# Patient Record
Sex: MALE | Race: WHITE | NOT HISPANIC OR LATINO | Employment: UNEMPLOYED | URBAN - METROPOLITAN AREA
[De-identification: names, ages, dates, MRNs, and addresses within clinical notes are randomized per-mention and may not be internally consistent; named-entity substitution may affect disease eponyms.]

---

## 2024-05-20 ENCOUNTER — APPOINTMENT (OUTPATIENT)
Dept: RADIOLOGY | Facility: CLINIC | Age: 19
End: 2024-05-20
Payer: COMMERCIAL

## 2024-05-20 VITALS
HEART RATE: 81 BPM | BODY MASS INDEX: 26.9 KG/M2 | SYSTOLIC BLOOD PRESSURE: 108 MMHG | DIASTOLIC BLOOD PRESSURE: 73 MMHG | WEIGHT: 203 LBS | HEIGHT: 73 IN

## 2024-05-20 DIAGNOSIS — M25.562 ACUTE PAIN OF LEFT KNEE: ICD-10-CM

## 2024-05-20 DIAGNOSIS — M22.41 CHONDROMALACIA OF RIGHT PATELLOFEMORAL JOINT: ICD-10-CM

## 2024-05-20 DIAGNOSIS — M25.561 RIGHT KNEE PAIN, UNSPECIFIED CHRONICITY: ICD-10-CM

## 2024-05-20 DIAGNOSIS — M25.561 RIGHT KNEE PAIN, UNSPECIFIED CHRONICITY: Primary | ICD-10-CM

## 2024-05-20 DIAGNOSIS — M25.562 LEFT KNEE PAIN, UNSPECIFIED CHRONICITY: ICD-10-CM

## 2024-05-20 DIAGNOSIS — M62.81 WEAKNESS OF RIGHT QUADRICEPS MUSCLE: ICD-10-CM

## 2024-05-20 PROCEDURE — 99204 OFFICE O/P NEW MOD 45 MIN: CPT | Performed by: ORTHOPAEDIC SURGERY

## 2024-05-20 PROCEDURE — 73564 X-RAY EXAM KNEE 4 OR MORE: CPT

## 2024-05-20 NOTE — PROGRESS NOTES
"Patient Name:  Rivas Gtz  MRN:  [id number]    Assessment & Plan     1. Right knee pain, unspecified chronicity  -     XR knee 4+ vw right injury; Future; Expected date: 05/20/2024  2. Acute pain of left knee  -     XR knee 4+ vw left injury; Future; Expected date: 05/20/2024  3. Chondromalacia of right patellofemoral joint  -     Ambulatory Referral to Physical Therapy; Future  4. Weakness of right quadriceps muscle  -     Ambulatory Referral to Physical Therapy; Future    Right knee quad weakness, bilateral knee patellar chondromalacia   X-rays and previous intraoperative arthroscopic images reviewed in office today with patient  Recommended nonoperative management of knee pain including outpatient PT to work on strengthening of quads, hamstring and hips, maintaining range of motion, short arc quad exercises and k-taping.   Continue OTC medication as needed for pain relief  Advised patient and mother to obtain op report from previous Right knee arthroscopy for evaluation  Follow up in 8 weeks for reevaluation. If patient's symptoms persist or worsen, can consider further imaging of the Right knee.     Chief Complaint     Bilateral knee pain    History of the Present Illness     Rivas Gtz is a 19 y.o. male with Bilateral knee pain, Right > Left. Patient admits Right knee pain ongoing for months with possible injury during  training. He admits to having knee arthroscopy with KAILASH biopsy 04/2023, performing PRP injections and \"1 month\" of outpatient PT. Patient had surgery and was told \"the meniscus healed, but the cartilage was wearing away unevenly\". Patient is in the  and in the process of getting medically discharged. In regards to the Left knee, patient admits about 1 month of pain without known injury. He states he has been compensating for the Right knee. He does have pain in the knees with stair ambulation. 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     Review of Systems " "    Review of Systems   Constitutional:  Negative for chills and fever.   HENT:  Negative for ear pain and sore throat.    Eyes:  Negative for pain and visual disturbance.   Respiratory:  Negative for cough and shortness of breath.    Cardiovascular:  Negative for chest pain and palpitations.   Gastrointestinal:  Negative for abdominal pain and vomiting.   Genitourinary:  Negative for dysuria and hematuria.   Musculoskeletal:  Negative for arthralgias and back pain.   Skin:  Negative for color change and rash.   Neurological:  Negative for seizures and syncope.   All other systems reviewed and are negative.      Physical Exam     /73   Pulse 81   Ht 6' 1\" (1.854 m)   Wt 92.1 kg (203 lb)   BMI 26.78 kg/m²     Right Knee  Range of motion from 0 to 140 degrees without pain.    There is no crepitus with range of motion.   There is no effusion.    There is tenderness over the medial patellar facet, medial joint line .    There is 4+/5 quadriceps strength and preserved tone.    The patient is able to perform a straight leg raise.      positive  patellar grind test.  Anterior drawer tests is negative  negative Lachman Test.   Posterior drawer test is   negative   Varus stress testing reveals no pain or instability at 0 and 30 degrees   Valgus stress testing reveals no pain or instability at 0 and 30 degrees  Isha's testing is negative   The patient is neurovascular intact distally.      Left Knee  Range of motion from 0 to 140 degrees without pain.    There is no crepitus with range of motion.   There is no effusion.    There is tenderness over the anteromedial joint line, medial femoral condyle.    There is 5/5 quadriceps strength and preserved tone.    The patient is able to perform a straight leg raise.      positive  patellar grind test.  Anterior drawer tests is negative  negative Lachman Test.   Posterior drawer test is   negative   Varus stress testing reveals no pain or instability at 0 and 30 degrees "   Valgus stress testing reveals no pain or instability at 0 and 30 degrees  Isha's testing is negative   The patient is neurovascular intact distally.      Able to perform body weight squat without pain  Dynamic valgus with Right sided single leg squat     Eyes:  Anicteric sclerae.  Neck:  Supple.  Lungs:  Normal respiratory effort.  Cardiovascular:  Capillary refill is less than 2 seconds.  Skin:  Intact without erythema.  Neurologic:  Sensation grossly intact to light touch.  Psychiatric:  Mood and affect are appropriate.    Data Review     I have personally reviewed pertinent films in PACS, and my interpretation follows:    X-rays taken 05/20/2024 of Right knee independently reviewed and demonstrate minimal to mild medial compartment joint space narrowing. Well maintained joint spaces. No acute fracture or dislocation.      X-rays taken 05/20/2024 of Left knee independently reviewed and demonstrate minimal to mild medial compartment joint space narrowing with well maintained joint spaces. No acute fracture or dislocation.     History reviewed. No pertinent past medical history.    Past Surgical History:   Procedure Laterality Date    KNEE SURGERY Right 04/19/2023       No Known Allergies    No current outpatient medications on file prior to visit.     No current facility-administered medications on file prior to visit.       Social History     Tobacco Use    Smoking status: Never    Smokeless tobacco: Never   Vaping Use    Vaping status: Never Used   Substance Use Topics    Alcohol use: Yes     Comment: soc    Drug use: Never       History reviewed. No pertinent family history.          Procedures Performed     Procedures  None       Steve Kwon DO

## 2024-06-13 ENCOUNTER — EVALUATION (OUTPATIENT)
Dept: PHYSICAL THERAPY | Facility: CLINIC | Age: 19
End: 2024-06-13
Payer: COMMERCIAL

## 2024-06-13 DIAGNOSIS — M22.41 CHONDROMALACIA OF RIGHT PATELLOFEMORAL JOINT: Primary | ICD-10-CM

## 2024-06-13 DIAGNOSIS — M62.81 WEAKNESS OF RIGHT QUADRICEPS MUSCLE: ICD-10-CM

## 2024-06-13 PROCEDURE — 97110 THERAPEUTIC EXERCISES: CPT

## 2024-06-13 PROCEDURE — 97162 PT EVAL MOD COMPLEX 30 MIN: CPT

## 2024-06-13 NOTE — PROGRESS NOTES
PT Evaluation     Today's date: 2024  Patient name: Rivas Gtz  : 2005  MRN: [id number]  Referring provider: Elizabeth Siddiqi PA-C  Dx:   Encounter Diagnosis     ICD-10-CM    1. Chondromalacia of right patellofemoral joint  M22.41 Ambulatory Referral to Physical Therapy      2. Weakness of right quadriceps muscle  M62.81 Ambulatory Referral to Physical Therapy          Start Time: 803  Stop Time: 843  Total time in clinic (min): 40 minutes    Assessment  Impairments: activity intolerance, impaired physical strength, lacks appropriate home exercise program, pain with function, participation limitations, activity limitations and endurance  Symptom irritability: high    Assessment details: Patient is a pleasant 19 y.o. male who presents to physical therapy with main reports of R knee pain.    No further referral appears necessary at this time based upon examination results.    Primary movement impairment diagnosis of quadriceps weakness resulting in pathoanatomical symptoms of decreased strength and increased symptom irritability. This limits Rivas's ability to return to previous activities such as walking long distances, hiking, and running.     Prognosis is good given HEP compliance and PT 2 times per week over the next 16 weeks.  Positive prognostic indicators include positive attitude toward recovery.  Negative prognostic indicators include two previous experiences w/ PT that resulted in no positive outcomes and the Pt's high symptom irritability level. .    Please contact me if you have any questions.  Thank you for the opportunity to share in Rivas Gtz care.    Understanding of Dx/Px/POC: good     Prognosis: fair    Goals  Short term:  Pt will be independent w/ individualized HEP  Pt will have a decrease in symptom irritability by 2 points     Long term:  Pt will be able to walk two miles w/ minimal symptom irritability  Pt will be able to hike one mile on rough terrain w/  minimal symptom irritability  Pt will be able to run short distances w/ minimal symptom irritability  Pt will improve FOTO by MDC      Plan  Patient would benefit from: skilled physical therapy  Referral necessary: No    Planned therapy interventions: abdominal trunk stabilization, activity modification, joint mobilization, kinesiology taping, manual therapy, massage, nerve gliding, neuromuscular re-education, strengthening, stretching, therapeutic activities, therapeutic exercise, therapeutic training, transfer training, home exercise program, graded exercise, graded activity, body mechanics training and balance    Frequency: 2x week  Plan of Care beginning date: 2024  Plan of Care expiration date: 10/3/2024  Treatment plan discussed with: patient        Subjective Evaluation    History of Present Illness  Mechanism of injury: Pt reports to outpatient PT for R knee pain that started over two years ago. 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 Pt reports that his knee pain was developed gradually over a prolonged period of time. Once seeking medical attention, Pt tried PT before surgery which had no positive results. Pt then had surgery for possible meniscus tear, found decreased cartilage in R knee. Pt had PT post surgery and had PRP injections which increased the Pt's knee pain. Pt reports the inability to squat to the ground, has pain with full extension, can not run, and can not hike due to the knee pain. Pt has been avoiding LE weight training due to the knee pain. Pt reported that PT interventions that did not help included Cupping and IASTM. No red flags are present.          Recurrent probem    Patient Goals  Patient goals for therapy: decreased pain, increased strength, independence with ADLs/IADLs, return to sport/leisure activities and increased motion  Patient goal: Biking, Hiking, and running  Pain  Current pain ratin  At best pain  ratin  At worst pain ratin  Location: Medial joint line  Quality: knife-like, sharp, tight and burning  Alleviating factors: nothing.  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     Neurological Testing     Sensation     Lumbar   Left   Intact: light touch    Right   Hypersensation: light touch    Comments   Right light touch: over the L3 dermatome    Reflexes   Left   Patellar (L4): normal (2+)  Achilles (S1): normal (2+)    Right   Patellar (L4): normal (2+)  Achilles (S1): normal (2+)    Active Range of Motion     Lumbar   Flexion:  WFL  Extension: Active lumbar extension: end range.  WFL and with pain  Left lateral flexion:  WFL and with pain  Right lateral flexion:  WFL  Left rotation:  WFL and with pain  Right rotation:  WFL  Left Knee   Normal active range of motion    Right Knee   Normal active range of motion    Passive Range of Motion   Left Knee   Normal passive range of motion    Right Knee   Normal passive range of motion    Mobility   Patellar Mobility:   Left Knee   WFL: medial, lateral, superior and inferior.     Right Knee   WFL: medial, lateral, superior and inferior    Patellar Mobility Comments   Right medial tendon comments: pain.     Patellar Static Positioning   Left Knee: WFL  Right Knee: WFL    Strength/Myotome Testing     Left Hip   Planes of Motion   Flexion: 4+  Extension: 4+  Abduction: 4+  Adduction: 4+    Right Hip   Planes of Motion   Flexion:  4+  Extension: 4+  Abduction: 4+  Adduction: 4+    Left Knee   Flexion: 4+  Prone flexion: 4+  Extension: 4+  Quadriceps contraction: good    Right Knee   Flexion: 4+  Prone flexion: 4+  Extension: 4  Quadriceps contraction: good    Left Ankle/Foot   Dorsiflexion: WFL.   Plantar flexion: WFL.   Inversion: WFL.   Eversion: WFL.   Great toe flexion: WFL.   Great toe extension: WFL.     Right Ankle/Foot   Dorsiflexion: WFL.   Plantar flexion: WFL.   Inversion: WFL.   Eversion: WFL.   Great toe flexion: WFL.   Great toe extension: WFL.     Additional Strength Details  Visible atrophy of the R quad compared to the L             Precautions: Heavy symptom irritbailty    POC expires Unit limit Auth Expiration date PT/OT/ST + Visit Limit?   10/3/24 BOMN N/A BOMN                           Visit/Unit Tracking  AUTH Status:  Date 6/13              N/A Used 1               Remaining                    HEP: [id number]   Manuals 6/13                                                                Neuro Re-Ed             BFR SLR             BFR side lying SLR             BFR SL hip bridge                                                                  Ther Ex             HEP education 10'            Slant board BLE 45 squat             SL barbell hip flex isometric             Lateral step up 45 degree isometric             Split squat isometric w/ plantar flexion             SL RDL                                       Ther Activity                                       Gait Training                                       Modalities

## 2024-06-17 ENCOUNTER — OFFICE VISIT (OUTPATIENT)
Dept: PHYSICAL THERAPY | Facility: CLINIC | Age: 19
End: 2024-06-17
Payer: COMMERCIAL

## 2024-06-17 DIAGNOSIS — M62.81 WEAKNESS OF RIGHT QUADRICEPS MUSCLE: ICD-10-CM

## 2024-06-17 DIAGNOSIS — M22.41 CHONDROMALACIA OF RIGHT PATELLOFEMORAL JOINT: Primary | ICD-10-CM

## 2024-06-17 PROCEDURE — 97110 THERAPEUTIC EXERCISES: CPT

## 2024-06-17 PROCEDURE — 97112 NEUROMUSCULAR REEDUCATION: CPT

## 2024-06-17 NOTE — PROGRESS NOTES
"Daily Note     Today's date: 2024  Patient name: Rivas Gtz  : 2005  MRN: [id number]  Referring provider: Elizabeth Siddiqi PA-C  Dx:   Encounter Diagnosis     ICD-10-CM    1. Chondromalacia of right patellofemoral joint  M22.41       2. Weakness of right quadriceps muscle  M62.81           Start Time: 0800  Stop Time: 0846  Total time in clinic (min): 46 minutes    Subjective: Pt reports no major changes from initial visit. Pt reported no issues w/ HEP.       Objective: See treatment diary below      Assessment: Tolerated treatment well. Initiated strengthening program. Pt reported dislike in utilizing the BFR. DC BFR NV and increase functional SL activities. Pt was challenged by isometric SL exercises. Pt utilized UE assist for SL balance. Patient demonstrated fatigue post treatment, exhibited good technique with therapeutic exercises, and would benefit from continued PT for increased strength and decreased symptom irritability.       Plan: Continue per plan of care.  Progress treatment as tolerated.       Precautions: Heavy symptom irritbailty    POC expires Unit limit Auth Expiration date PT/OT/ST + Visit Limit?   10/3/24 BOMN N/A BOMN                           Visit/Unit Tracking  AUTH Status:  Date              N/A Used 1 2              Remaining                    HEP: [id number]   Manuals                                                                Neuro Re-Ed             BFR SLR  BFR 30,15,15, 15           BFR side lying SLR  BFR 30,15,15, 15           BFR SL hip bridge   BFR 30,15,15, 15                                                               Ther Ex             HEP education 10'            Slant board BLE 45 squat             SL barbell hip flex isometric  30\" x4           Lateral step up 45 degree isometric  15\" x4           Split squat isometric w/ plantar flexion  15\" x3           SL RDL                          Bike  5'           Ther Activity               "                         Gait Training                                       Modalities

## 2024-06-19 ENCOUNTER — OFFICE VISIT (OUTPATIENT)
Dept: PHYSICAL THERAPY | Facility: CLINIC | Age: 19
End: 2024-06-19
Payer: COMMERCIAL

## 2024-06-19 DIAGNOSIS — M62.81 WEAKNESS OF RIGHT QUADRICEPS MUSCLE: ICD-10-CM

## 2024-06-19 DIAGNOSIS — M22.41 CHONDROMALACIA OF RIGHT PATELLOFEMORAL JOINT: Primary | ICD-10-CM

## 2024-06-19 PROCEDURE — 97112 NEUROMUSCULAR REEDUCATION: CPT

## 2024-06-19 PROCEDURE — 97110 THERAPEUTIC EXERCISES: CPT

## 2024-06-19 NOTE — PROGRESS NOTES
"Daily Note     Today's date: 2024  Patient name: Rivas Gtz  : 2005  MRN: [id number]  Referring provider: Elizabeth Siddiqi PA-C  Dx:   Encounter Diagnosis     ICD-10-CM    1. Chondromalacia of right patellofemoral joint  M22.41       2. Weakness of right quadriceps muscle  M62.81           Start Time: 1225  Stop Time: 1305  Total time in clinic (min): 40 minutes    Subjective: Pt reports he was sore and had onset of burning sensation in the knee following last session.       Objective: See treatment diary below      Assessment: Tolerated treatment well. Pt unable to squat deeper than 45 degrees as the pain increases above a 5/10 . Pt able to hold an Isometric hold at 45 degrees w/ tension in quad, but no pain in the knee. Pt did better w/ SL exercises, required cueing for most of new exercises. Pt has less symptom irritability moving through smaller ranges of motion, continue to challenge Pt w/ isometric exercises. Patient demonstrated fatigue post treatment, exhibited good technique with therapeutic exercises, and would benefit from continued PT for increased strength and decreased symptom irritability.       Plan: Continue per plan of care.  Progress treatment as tolerated.       Precautions: Heavy symptom irritbailty    POC expires Unit limit Auth Expiration date PT/OT/ST + Visit Limit?   10/3/24 BOMN N/A BOMN                           Visit/Unit Tracking  AUTH Status:  Date             N/A Used 1 2 3             Remaining                    HEP: [id number]   Manuals                                                               Neuro Re-Ed             BFR SLR  BFR 30,15,15, 15           BFR side lying SLR  BFR 30,15,15, 15           BFR SL hip bridge   BFR 30,15,15, 15           SL BOSU balance   30\" x4          SL BOSU eccentric taps   2x10                                    Ther Ex             HEP education 10'            Slant board BLE 45 iso   10\" x10        " "  SL barbell hip flex isometric  30\" x4           Lateral step up 45 degree isometric  15\" x4           Step up   2x10 24\"          Split squat isometric w/ plantar flexion  15\" x3 15\" x5          SL RDL   2x10          Multi-directional walking [place]    X10 25#          Bike  5' 5'          Ther Activity                                       Gait Training                                       Modalities                                            "

## 2024-06-25 ENCOUNTER — OFFICE VISIT (OUTPATIENT)
Dept: PHYSICAL THERAPY | Facility: CLINIC | Age: 19
End: 2024-06-25
Payer: COMMERCIAL

## 2024-06-25 DIAGNOSIS — M62.81 WEAKNESS OF RIGHT QUADRICEPS MUSCLE: ICD-10-CM

## 2024-06-25 DIAGNOSIS — M22.41 CHONDROMALACIA OF RIGHT PATELLOFEMORAL JOINT: Primary | ICD-10-CM

## 2024-06-25 PROCEDURE — 97110 THERAPEUTIC EXERCISES: CPT

## 2024-06-25 PROCEDURE — 97112 NEUROMUSCULAR REEDUCATION: CPT

## 2024-06-25 NOTE — PROGRESS NOTES
"Daily Note     Today's date: 2024  Patient name: Rivas Gtz  : 2005  MRN: [id number]  Referring provider: Elizabeth Siddiqi PA-C  Dx:   Encounter Diagnosis     ICD-10-CM    1. Chondromalacia of right patellofemoral joint  M22.41       2. Weakness of right quadriceps muscle  M62.81           Start Time: 0800  Stop Time: 0840  Total time in clinic (min): 40 minutes    Subjective: Pt reports his knee is sore from a long weekend of heavy activity as the Pt was playing air soft. Pt reported that movement felt better than prolonged standing.        Objective: See treatment diary below      Assessment: Tolerated treatment well. Pt reported an increase in symptom irritability in the quad following eccentric step downs on BOSU. Foam rolling the quad decreased soreness levels minimally. Pt requires heavy cueing for exercises. Most of the Pt's symptom irritability is in the lateral quad. Patient demonstrated fatigue post treatment and would benefit from continued PT for increased strength and decreased symptom irritability.       Plan: Continue per plan of care.  Progress treatment as tolerated.       Precautions: Heavy symptom irritbailty    POC expires Unit limit Auth Expiration date PT/OT/ST + Visit Limit?   10/3/24 BOMN N/A BOMN                           Visit/Unit Tracking  AUTH Status:  Date            N/A Used 1 2 3 4            Remaining                    HEP: [id number]   Manuals          FR quad    JMK                                                Neuro Re-Ed             BFR SLR  BFR 30,15,15, 15           BFR side lying SLR  BFR 30,15,15, 15           BFR SL hip bridge   BFR 30,15,15, 15           SL BOSU balance   30\" x4 20\" x4         SL BOSU eccentric taps   2x10 2x10         SL BOSU hip flexion    2x10 ea                      Ther Ex             HEP education 10'            Slant board BLE 45 iso   10\" x10          SL barbell hip flex isometric  30\" x4   " "        Lateral step up 45 degree isometric  15\" x4           Step up   2x10 24\"          Split squat isometric w/ plantar flexion  15\" x3 15\" x5          SL RDL   2x10          Multi-directional walking [place]    X10 25# X10 25#         Slant board split squat    2x10         Bike  5' 5' 5'         Ther Activity                                       Gait Training                                       Modalities                                            "

## 2024-06-27 ENCOUNTER — OFFICE VISIT (OUTPATIENT)
Dept: PHYSICAL THERAPY | Facility: CLINIC | Age: 19
End: 2024-06-27
Payer: COMMERCIAL

## 2024-06-27 DIAGNOSIS — M22.41 CHONDROMALACIA OF RIGHT PATELLOFEMORAL JOINT: Primary | ICD-10-CM

## 2024-06-27 DIAGNOSIS — M62.81 WEAKNESS OF RIGHT QUADRICEPS MUSCLE: ICD-10-CM

## 2024-06-27 PROCEDURE — 97112 NEUROMUSCULAR REEDUCATION: CPT

## 2024-06-27 PROCEDURE — 97110 THERAPEUTIC EXERCISES: CPT

## 2024-06-27 NOTE — PROGRESS NOTES
"Daily Note     Today's date: 2024  Patient name: Rivas Gtz  : 2005  MRN: [id number]  Referring provider: Elizabeth Siddiqi PA-C  Dx:   Encounter Diagnosis     ICD-10-CM    1. Chondromalacia of right patellofemoral joint  M22.41       2. Weakness of right quadriceps muscle  M62.81           Start Time: 0800  Stop Time: 0845  Total time in clinic (min): 45 minutes    Subjective: Pt reports his ws sore following last session for about 24 hours. Pt felt normal yesterday, w/ his normal symptom presentation.       Objective: See treatment diary below      Assessment: Tolerated treatment well. Pt FOTO increased 5 points compared to initial visit. Pt reported fatigue in the quad, w/ no report of increased knee pain. Pt did better w/ exercises requiring less frequent cueing. Pt demonstrated good SL balance w/ BOSU activities. Patient demonstrated fatigue post treatment, exhibited good technique with therapeutic exercises, and would benefit from continued PT for increased strength and decreased symptom irritability.       Plan: Continue per plan of care.  Progress treatment as tolerated.       Precautions: Heavy symptom irritbailty    POC expires Unit limit Auth Expiration date PT/OT/ST + Visit Limit?   10/3/24 BOMN N/A BOMN                           Visit/Unit Tracking  AUTH Status:  Date           N/A Used 1 2 3 4 5           Remaining                    HEP: [id number]   Manuals         FR quad    JMK                                                Neuro Re-Ed             BFR SLR  BFR 30,15,15, 15           BFR side lying SLR  BFR 30,15,15, 15           BFR SL hip bridge   BFR 30,15,15, 15           SL BOSU balance   30\" x4 20\" x4 30\" x4        SL BOSU eccentric taps   2x10 2x10 2x10        SL BOSU hip flexion    2x10 ea                      Ther Ex             HEP education 10'            Slant board BLE 45 iso   10\" x10          SL barbell hip flex " "isometric  30\" x4           Lateral step up 45 degree isometric  15\" x4           Step up   2x10 24\"          Split squat isometric w/ plantar flexion  15\" x3 15\" x5          SL RDL   2x10          Multi-directional walking [place]    X10 25# X10 25# X10  35#        Slant board split squat    2x10 3x5 25#        Sled pull/push     x10        Wall sit      X2 till failure 25#        Bike  5' 5' 5' 5'        Ther Activity                                       Gait Training                                       Modalities                                            "

## 2024-07-01 ENCOUNTER — OFFICE VISIT (OUTPATIENT)
Dept: PHYSICAL THERAPY | Facility: CLINIC | Age: 19
End: 2024-07-01
Payer: COMMERCIAL

## 2024-07-01 DIAGNOSIS — M22.41 CHONDROMALACIA OF RIGHT PATELLOFEMORAL JOINT: Primary | ICD-10-CM

## 2024-07-01 DIAGNOSIS — M62.81 WEAKNESS OF RIGHT QUADRICEPS MUSCLE: ICD-10-CM

## 2024-07-01 PROCEDURE — 97112 NEUROMUSCULAR REEDUCATION: CPT

## 2024-07-01 PROCEDURE — 97110 THERAPEUTIC EXERCISES: CPT

## 2024-07-01 NOTE — PROGRESS NOTES
"Daily Note     Today's date: 2024  Patient name: Rivas Gtz  : 2005  MRN: [id number]  Referring provider: Elizabeth Siddiqi PA-C  Dx:   Encounter Diagnosis     ICD-10-CM    1. Chondromalacia of right patellofemoral joint  M22.41       2. Weakness of right quadriceps muscle  M62.81         Start Time: 0730  Stop Time: 0810  Total time in clinic (min): 40 minutes    Subjective: Pt reports that he did a lot of walking over the weekend which caused onset of knee pain following about 30 minutes of consistent walking.       Objective: See treatment diary below      Assessment: Tolerated treatment well. Pt reported less symptom irritability w/ eccentric control during step downs. Pt still challenged by SL isometric holds. Continue to challenge both SL balance and strength. Patient demonstrated fatigue post treatment, exhibited good technique with therapeutic exercises, and would benefit from continued PT for increased strength and decreased symptom irritability.       Plan: Continue per plan of care.  Progress treatment as tolerated.       Precautions: Heavy symptom irritbailty    POC expires Unit limit Auth Expiration date PT/OT/ST + Visit Limit?   10/3/24 BOMN N/A BOMN                           Visit/Unit Tracking  AUTH Status:  Date          N/A Used 1 2 3 4 5 6          Remaining                    HEP: [email or web address]   Manuals        FR quad    JMK                                                Neuro Re-Ed             BFR SLR  BFR 30,15,15, 15           BFR side lying SLR  BFR 30,15,15, 15           BFR SL hip bridge   BFR 30,15,15, 15           SL BOSU balance   30\" x4 20\" x4 30\" x4 30\" x4       SL BOSU eccentric taps   2x10 2x10 2x10 2x10       SL BOSU hip flexion    2x10 ea                      Ther Ex             HEP education 10'            Slant board BLE 45 iso   10\" x10          SL barbell hip flex isometric  30\" x4           Lateral step up " "45 degree isometric  15\" x4    20\" x4       Step up   2x10 24\"   3x10 24\"       Split squat isometric w/ plantar flexion  15\" x3 15\" x5          SL RDL   2x10          Multi-directional walking [place]    X10 25# X10 25# X10  35# X10  35#       Slant board split squat    2x10 3x5 25#        Sled pull/push     x10 x10       Wall sit      X2 till failure 25# X2 till failure 25#       Bike  5' 5' 5' 5' 5'       Ther Activity                                       Gait Training                                       Modalities                                            "

## 2024-07-03 ENCOUNTER — OFFICE VISIT (OUTPATIENT)
Dept: PHYSICAL THERAPY | Facility: CLINIC | Age: 19
End: 2024-07-03
Payer: COMMERCIAL

## 2024-07-03 DIAGNOSIS — M62.81 WEAKNESS OF RIGHT QUADRICEPS MUSCLE: ICD-10-CM

## 2024-07-03 DIAGNOSIS — M22.41 CHONDROMALACIA OF RIGHT PATELLOFEMORAL JOINT: Primary | ICD-10-CM

## 2024-07-03 PROCEDURE — 97112 NEUROMUSCULAR REEDUCATION: CPT | Performed by: PHYSICAL MEDICINE & REHABILITATION

## 2024-07-03 PROCEDURE — 97110 THERAPEUTIC EXERCISES: CPT | Performed by: PHYSICAL MEDICINE & REHABILITATION

## 2024-07-03 NOTE — PROGRESS NOTES
"Daily Note     Today's date: 7/3/2024  Patient name: Rivas Gtz  : 2005  MRN: [id number]  Referring provider: Elizabeth Siddiqi PA-C  Dx:   Encounter Diagnosis     ICD-10-CM    1. Chondromalacia of right patellofemoral joint  M22.41       2. Weakness of right quadriceps muscle  M62.81                    Subjective: Patient reports pain with all activity, stairclimbing reported as producing most significant pain. No significant improvement in symptoms.     Objective: See treatment diary below    Assessment: Tolerated treatment well overall. Decreased balance control noted with RDLs. Patient demonstrated fatigue post treatment, exhibited good technique with therapeutic exercises, and would benefit from continued PT. Continue as able.       Plan: Continue per plan of care.  Progress treatment as tolerated.       Precautions: Heavy symptom irritbailty    POC expires Unit limit Auth Expiration date PT/OT/ST + Visit Limit?   10/3/24 BOMN N/A BOMN                           Visit/Unit Tracking  AUTH Status:  Date 6/13 6/17 6/19 6/25 6/27 7/1 7/3        N/A Used 1 2 3 4 5 6 7         Remaining                    HEP: [email or web address]   Manuals 6/13 6/17 6/19 6/25 6/27 7/1 7/3      FR quad    JMK                                                Neuro Re-Ed       7/3      BFR SLR  BFR 30,15,15, 15     SL RDL foam 4x5x5\"      BFR side lying SLR  BFR 30,15,15, 15           BFR SL hip bridge   BFR 30,15,15, 15           SL BOSU balance   30\" x4 20\" x4 30\" x4 30\" x4 4x30\"      SL BOSU eccentric taps   2x10 2x10 2x10 2x10 3x10       SL BOSU hip flexion    2x10 ea                      Ther Ex       7/3      HEP education 10'            Slant board BLE 45 iso   10\" x10    Squat heels on foam beam 2x10      SL barbell hip flex isometric  30\" x4           Lateral step up 45 degree isometric  15\" x4    20\" x4       Step up   2x10 24\"   3x10 24\" 20\" 3x10      Split squat isometric w/ plantar flexion  15\" x3 15\" x5          SL RDL   " 2x10          Multi-directional walking [place]    X10 25# X10 25# X10  35# X10  35#       Slant board split squat    2x10 3x5 25#        Sled pull/push     x10 x10 10 laps      Wall sit      X2 till failure 25# X2 till failure 25# 2xF 25#      Bike  5' 5' 5' 5' 5' 5'      Ther Activity                                       Gait Training                                       Modalities

## 2024-07-08 ENCOUNTER — OFFICE VISIT (OUTPATIENT)
Dept: PHYSICAL THERAPY | Facility: CLINIC | Age: 19
End: 2024-07-08
Payer: COMMERCIAL

## 2024-07-08 DIAGNOSIS — M22.41 CHONDROMALACIA OF RIGHT PATELLOFEMORAL JOINT: Primary | ICD-10-CM

## 2024-07-08 DIAGNOSIS — M62.81 WEAKNESS OF RIGHT QUADRICEPS MUSCLE: ICD-10-CM

## 2024-07-08 PROCEDURE — 97110 THERAPEUTIC EXERCISES: CPT

## 2024-07-08 PROCEDURE — 97112 NEUROMUSCULAR REEDUCATION: CPT

## 2024-07-08 NOTE — PROGRESS NOTES
"Daily Note     Today's date: 2024  Patient name: Rivas Gtz  : 2005  MRN: [id number]  Referring provider: Elizabeth Siddiqi PA-C  Dx:   Encounter Diagnosis     ICD-10-CM    1. Chondromalacia of right patellofemoral joint  M22.41       2. Weakness of right quadriceps muscle  M62.81           Start Time: 0800  Stop Time: 0840  Total time in clinic (min): 40 minutes    Subjective: Patient reports he was able to walk more than 8 miles before having to take a break due to increased sharp pain in R knee.       Objective: See treatment diary below      Assessment: Tolerated treatment well. Pt reported muscle twitching in R quad following landmine squats. Pt still challenged by SL balance. Pt reported minor burning and pain in R medial knee post session. Patient demonstrated fatigue post treatment, exhibited good technique with therapeutic exercises, and would benefit from continued PT for increased strength, increased balance, and decreased symptom irritability.       Plan: Continue per plan of care.  Progress treatment as tolerated.       Precautions: Heavy symptom irritbailty    POC expires Unit limit Auth Expiration date PT/OT/ST + Visit Limit?   10/3/24 BOMN N/A BOMN                           Visit/Unit Tracking  AUTH Status:  Date 6/13 6/17 6/19 6/25 6/27 7/1 7/3 7/8       N/A Used 1 2 3 4 5 6 7 8        Remaining                    HEP: [email or web address]   Manuals 6/13 6/17 6/19 6/25 6/27 7/1 7/3 7     FR quad    JMK                                                Neuro Re-Ed       7/3      BFR SLR  BFR 30,15,15, 15     SL RDL foam 4x5x5\"      BFR side lying SLR  BFR 30,15,15, 15           BFR SL hip bridge   BFR 30,15,15, 15           SL BOSU balance   30\" x4 20\" x4 30\" x4 30\" x4 4x30\" 4x30\"     SL BOSU eccentric taps   2x10 2x10 2x10 2x10 3x10       SL BOSU hip flexion    2x10 ea         SL RDL BOSU        4x5 5\"     Ther Ex       7/3      HEP education 10'            Slant board BLE 45 iso   10\" x10    " "Squat heels on foam beam 2x10      SL barbell hip flex isometric  30\" x4           Lateral step up 45 degree isometric  15\" x4    20\" x4       Step up   2x10 24\"   3x10 24\" 20\" 3x10      Split squat isometric w/ plantar flexion  15\" x3 15\" x5          SL RDL   2x10          Multi-directional walking Marcie    X10 25# X10 25# X10  35# X10  35#       Slant board split squat    2x10 3x5 25#   2x10 25#     Sled pull/push     x10 x10 10 laps x10     Wall sit      X2 till failure 25# X2 till failure 25# 2xF 25#      Heel elevated squat         3x10 25#     Landmine split and hack squat        25# 2x10 ea     Bike  5' 5' 5' 5' 5' 5' 5'     Ther Activity                                       Gait Training                                       Modalities                                            "

## 2024-07-10 ENCOUNTER — OFFICE VISIT (OUTPATIENT)
Dept: PHYSICAL THERAPY | Facility: CLINIC | Age: 19
End: 2024-07-10
Payer: COMMERCIAL

## 2024-07-10 DIAGNOSIS — M62.81 WEAKNESS OF RIGHT QUADRICEPS MUSCLE: ICD-10-CM

## 2024-07-10 DIAGNOSIS — M22.41 CHONDROMALACIA OF RIGHT PATELLOFEMORAL JOINT: Primary | ICD-10-CM

## 2024-07-10 PROCEDURE — 97110 THERAPEUTIC EXERCISES: CPT

## 2024-07-10 NOTE — PROGRESS NOTES
"Daily Note     Today's date: 7/10/2024  Patient name: Rivas Gtz  : 2005  MRN: [id number]  Referring provider: Elizabeth Siddiqi PA-C  Dx:   Encounter Diagnosis     ICD-10-CM    1. Chondromalacia of right patellofemoral joint  M22.41       2. Weakness of right quadriceps muscle  M62.81           Start Time: 0800  Stop Time: 0844  Total time in clinic (min): 44 minutes    Subjective: Patient reports heavy soreness following last session in bilateral quadriceps.       Objective: See treatment diary below      Assessment: Tolerated treatment well. Pt reported increased soreness following today's session. Pt has follow-up w/ physician Monday to determine if he will continue therapy. Monitor Pt NV or possible discharge. Pt was unable to complete an RDL. Pt was provided heavy cueing for exercise but was unable to maintain proper technique, DC to prevent injury. Pt did better w/ isometric exercises and demonstrated more stability when in SL position. Pt has made an improvement in overall strength and stability since starting therapy, would recommenced more therapy to continue to strength the R knee. Patient demonstrated fatigue post treatment, exhibited good technique with therapeutic exercises, and would benefit from continued PT for increased strength, increased balance, and decreased symptom irritability.       Plan: Continue per plan of care.  Progress treatment as tolerated.       Precautions: Heavy symptom irritbailty    POC expires Unit limit Auth Expiration date PT/OT/ST + Visit Limit?   10/3/24 BOMN N/A BOMN                           Visit/Unit Tracking  AUTH Status:  Date 6/13 6/17 6/19 6/25 6/27 7/1 7/3 7/8 7/10      N/A Used 1 2 3 4 5 6 7 8 9       Remaining                    HEP: [email or web address]   Manuals  7/3 7/8 7/10    FR quad    JMK                                                Neuro Re-Ed       7/3      BFR SLR  BFR 30,15,15, 15     SL RDL foam 4x5x5\"      BFR side " "lying SLR  BFR 30,15,15, 15           BFR SL hip bridge   BFR 30,15,15, 15           SL BOSU balance   30\" x4 20\" x4 30\" x4 30\" x4 4x30\" 4x30\"     SL BOSU eccentric taps   2x10 2x10 2x10 2x10 3x10       SL BOSU hip flexion    2x10 ea         SL RDL BOSU        4x5 5\"     Ther Ex       7/3      HEP education 10'            Slant board BLE 45 iso   10\" x10    Squat heels on foam beam 2x10  10\"x10    SL barbell hip flex isometric  30\" x4       30\" x5    Lateral step up 45 degree isometric  15\" x4    20\" x4       Step up   2x10 24\"   3x10 24\" 20\" 3x10      Split squat isometric w/ plantar flexion  15\" x3 15\" x5          SL RDL   2x10          Multi-directional walking [place]    X10 25# X10 25# X10  35# X10  35#       Slant board split squat    2x10 3x5 25#   2x10 25#     Sled pull/push     x10 x10 10 laps x10 x8    Wall sit      X2 till failure 25# X2 till failure 25# 2xF 25#      Heel elevated squat         3x10 25#     Landmine split and hack squat        25# 2x10 ea     Reverse nordic curls w/ assist         2x10 20#    RDL         Unable    Lunge Matrix         X10 ea    Bike  5' 5' 5' 5' 5' 5' 5' 5'    Ther Activity                                       Gait Training                                       Modalities                                            "

## 2024-07-15 ENCOUNTER — OFFICE VISIT (OUTPATIENT)
Dept: OBGYN CLINIC | Facility: CLINIC | Age: 19
End: 2024-07-15
Payer: COMMERCIAL

## 2024-07-15 VITALS
DIASTOLIC BLOOD PRESSURE: 73 MMHG | BODY MASS INDEX: 26.77 KG/M2 | WEIGHT: 202 LBS | HEIGHT: 73 IN | HEART RATE: 61 BPM | SYSTOLIC BLOOD PRESSURE: 113 MMHG

## 2024-07-15 DIAGNOSIS — M22.41 CHONDROMALACIA OF RIGHT PATELLOFEMORAL JOINT: ICD-10-CM

## 2024-07-15 DIAGNOSIS — M23.91 INTERNAL DERANGEMENT OF RIGHT KNEE: Primary | ICD-10-CM

## 2024-07-15 PROCEDURE — 99214 OFFICE O/P EST MOD 30 MIN: CPT | Performed by: ORTHOPAEDIC SURGERY

## 2024-07-15 NOTE — PROGRESS NOTES
"Patient Name:  Rivas Gtz  MRN:  [id number]    Assessment & Plan     1. Internal derangement of right knee  -     MRI knee right  wo contrast; Future; Expected date: 07/15/2024  2. Chondromalacia of right patellofemoral joint  3. Weakness of right quadriceps muscle    Right knee quadriceps weakness, bilateral knee patellar chondromalacia.   Continue home exercises as prescribed  Continue outpatient physical therapy, may decrease to once per week  Due to persistent pain despite PT, activity modification, home exercise program, right knee MRI study was ordered to evaluate cartilage surfaces and alignment to assess for possible patellofemoral chondral defect.   OTC analgesics as needed for symptom management  Follow up after MRI study      History of the Present Illness   Rivas Gtz is a 19 y.o. male with Right knee quadriceps weakness, bilateral knee patellar chondromalacia. He was last seen in the office on 5/20/2024 when he was referred to outpatient physical therapy. He feels about the same as his last visit. He has not felt any better or worse with the exercises at PT. He is feeling clicking in his VMO and medial knee. He admits his activity level is the same. He is sitting most of the day at his job.        Review of Systems     Review of Systems   Constitutional:  Negative for chills and fever.   HENT:  Negative for ear pain and sore throat.    Eyes:  Negative for pain and visual disturbance.   Respiratory:  Negative for cough and shortness of breath.    Cardiovascular:  Negative for chest pain and palpitations.   Gastrointestinal:  Negative for abdominal pain and vomiting.   Genitourinary:  Negative for dysuria and hematuria.   Musculoskeletal:  Negative for arthralgias and back pain.   Skin:  Negative for color change and rash.   Neurological:  Negative for seizures and syncope.   All other systems reviewed and are negative.      Physical Exam     /73   Pulse 61   Ht 6' 1\" (1.854 m)   " Wt 91.6 kg (202 lb)   BMI 26.65 kg/m²     Right Knee  Range of motion from 0 to 130.    There is no effusion.    There is mild tenderness over the lateral patellar facet, MCL, VMO, distal adductor tendon  Positive patella grind  1+ quadrant lateral translation with firm endpoint.    The patient is able to perform a straight leg raise.    Varus stress testing reveals no instability at 0 and 30 degrees   Valgus stress testing reveals no instability at 0 and 30 degrees  The patient is neurovascular intact distally.      Data Review     I have personally reviewed pertinent films in PACS, and my interpretation follows.    X-rays taken 05/20/2024 of Right knee independently reviewed and demonstrate minimal to mild medial compartment joint space narrowing. Well maintained joint spaces. No acute fracture or dislocation.       X-rays taken 05/20/2024 of Left knee independently reviewed and demonstrate minimal to mild medial compartment joint space narrowing with well maintained joint spaces. No acute fracture or dislocation.   Social History     Tobacco Use    Smoking status: Never    Smokeless tobacco: Never   Vaping Use    Vaping status: Never Used   Substance Use Topics    Alcohol use: Yes     Comment: soc    Drug use: Never           Procedures  None.    Faby Quezada   Scribe Attestation      I,:  Faby Quezada am acting as a scribe while in the presence of the attending physician.:       I,:  Steve Kwon, DO personally performed the services described in this documentation    as scribed in my presence.:

## 2024-07-23 ENCOUNTER — HOSPITAL ENCOUNTER (OUTPATIENT)
Dept: MRI IMAGING | Facility: CLINIC | Age: 19
Discharge: HOME | End: 2024-07-23
Payer: COMMERCIAL

## 2024-07-23 DIAGNOSIS — M23.91 INTERNAL DERANGEMENT OF RIGHT KNEE: ICD-10-CM

## 2024-07-23 PROCEDURE — 73721 MRI JNT OF LWR EXTRE W/O DYE: CPT

## 2024-07-25 ENCOUNTER — OFFICE VISIT (OUTPATIENT)
Dept: PHYSICAL THERAPY | Facility: CLINIC | Age: 19
End: 2024-07-25
Payer: COMMERCIAL

## 2024-07-25 DIAGNOSIS — M62.81 WEAKNESS OF RIGHT QUADRICEPS MUSCLE: ICD-10-CM

## 2024-07-25 DIAGNOSIS — M22.41 CHONDROMALACIA OF RIGHT PATELLOFEMORAL JOINT: Primary | ICD-10-CM

## 2024-07-25 PROCEDURE — 97112 NEUROMUSCULAR REEDUCATION: CPT

## 2024-07-25 PROCEDURE — 97110 THERAPEUTIC EXERCISES: CPT

## 2024-07-25 NOTE — PROGRESS NOTES
"Daily Note     Today's date: 2024  Patient name: Rivas Gtz  : 2005  MRN: [id number]  Referring provider: Elizabeth Siddiqi PA-C  Dx:   Encounter Diagnosis     ICD-10-CM    1. Chondromalacia of right patellofemoral joint  M22.41       2. Weakness of right quadriceps muscle  M62.81         Start Time: 1615  Stop Time: 1655  Total time in clinic (min): 40 minutes    Subjective: Patient reports that he has a scheduled MRI next week. Pt then has follow-up w/ orthopedic for results. Pt plans on PT one time per week w/ transition in HEP.       Objective: See treatment diary below      Assessment: Tolerated treatment well. Pt did well w/ return to PT and introduction to hypertrophy training for R quadriceps. Pt had the majority of his symptom irritability in the medial knee. Pt did better w/ balance exercises, demonstrated more SL control. Patient demonstrated fatigue post treatment, exhibited good technique with therapeutic exercises, and would benefit from continued PT for increased strength, increased balance, and decreased symptom irritability.       Plan: Continue per plan of care.  Progress treatment as tolerated.       Precautions: Heavy symptom irritbailty    POC expires Unit limit Auth Expiration date PT/OT/ST + Visit Limit?   10/3/24 BOMN N/A BOMN                           Visit/Unit Tracking  AUTH Status:  Date 6/13 6/17 6/19 6/25 6/27 7/1 7/3 7/8 7/10 7/25     N/A Used 1 2 3 4 5 6 7 8 9 10      Remaining                    HEP: [email or web address]   Manuals 6/13 6/17 6/19 6/25 6/27 7/1 7/3 7/8 7/10 7/25   FR quad    JMK                                                Neuro Re-Ed       7/3      BFR SLR  BFR 30,15,15, 15     SL RDL foam 4x5x5\"      BFR side lying SLR  BFR 30,15,15, 15           BFR SL hip bridge   BFR 30,15,15, 15           SL BOSU balance   30\" x4 20\" x4 30\" x4 30\" x4 4x30\" 4x30\"     SL BOSU eccentric taps   2x10 2x10 2x10 2x10 3x10    2x10 ea   SL BOSU hip flexion    2x10 ea         SL " "RDL BOSU        4x5 5\"  2x10 5\"   Ther Ex       7/3      HEP education 10'            Slant board BLE 45 iso   10\" x10    Squat heels on foam beam 2x10  10\"x10    SL barbell hip flex isometric  30\" x4       30\" x5    Lateral step up 45 degree isometric  15\" x4    20\" x4       Step up   2x10 24\"   3x10 24\" 20\" 3x10      Split squat isometric w/ plantar flexion  15\" x3 15\" x5          SL RDL   2x10          Multi-directional walking [place]    X10 25# X10 25# X10  35# X10  35#       Slant board split squat    2x10 3x5 25#   2x10 25#     Sled pull/push     x10 x10 10 laps x10 x8 x10   Wall sit      X2 till failure 25# X2 till failure 25# 2xF 25#      Heel elevated squat         3x10 25#     Landmine split and hack squat        25# 2x10 ea     Reverse nordic curls w/ assist         2x10 20#    RDL         Unable    Lunge Matrix         X10 ea X10 ea   SL leg press          3x15 85#   Bike  5' 5' 5' 5' 5' 5' 5' 5' 5'   Ther Activity                                       Gait Training                                       Modalities                                            "

## 2024-08-01 ENCOUNTER — OFFICE VISIT (OUTPATIENT)
Dept: PHYSICAL THERAPY | Facility: CLINIC | Age: 19
End: 2024-08-01
Payer: COMMERCIAL

## 2024-08-01 DIAGNOSIS — M62.81 WEAKNESS OF RIGHT QUADRICEPS MUSCLE: ICD-10-CM

## 2024-08-01 DIAGNOSIS — M22.41 CHONDROMALACIA OF RIGHT PATELLOFEMORAL JOINT: Primary | ICD-10-CM

## 2024-08-01 PROCEDURE — 97110 THERAPEUTIC EXERCISES: CPT

## 2024-08-01 PROCEDURE — 97112 NEUROMUSCULAR REEDUCATION: CPT

## 2024-08-01 NOTE — PROGRESS NOTES
"Daily Note     Today's date: 2024  Patient name: Rivsa Gtz  : 2005  MRN: [id number]  Referring provider: Elizabeth Siddiqi PA-C  Dx:   Encounter Diagnosis     ICD-10-CM    1. Chondromalacia of right patellofemoral joint  M22.41       2. Weakness of right quadriceps muscle  M62.81         Start Time: 1618  Stop Time: 1700  Total time in clinic (min): 42 minutes    Subjective: Patient reports that he has a scheduled MRI next week. Pt then has follow-up w/ orthopedic for results. Pt plans on PT one time per week w/ transition in HEP.       Objective: See treatment diary below      Assessment: Tolerated treatment well. Pt reported numbness and tinging down RLE during SL leg press today. Radiating symptoms went from back of calf to the dorsal aspect of Pt's R foot. Pt reported that the radicular symptoms decreased after conclusion of leg press. Pt reported no back pain. Pt reported burning in medial aspect of R knee throughout the session. Reviewed MRI results w/ Pt, Pt has follow up w/ referring physician next week to discuss future POC. Patient demonstrated fatigue post treatment, exhibited good technique with therapeutic exercises, and would benefit from continued PT for increased strength, increased balance, and decreased symptom irritability.       Plan: Continue per plan of care.  Progress treatment as tolerated.       Precautions: Heavy symptom irritbailty    POC expires Unit limit Auth Expiration date PT/OT/ST + Visit Limit?   10/3/24 BOMN N/A BOMN                           Visit/Unit Tracking  AUTH Status:  Date 6/13 6/17 6/19 6/25 6/27 7/1 7/3 7/8 7/10 7/25 8/1    N/A Used 1 2 3 4 5 6 7 8 9 10 11     Remaining                    HEP: [email or web address]   Manuals  7/3 7/8 7/10 7/25 8/1   FR berta CULLENK                                                    Neuro Re-Ed       7/3       BFR SLR  BFR 30,15,15, 15     SL RDL foam 4x5x5\"       BFR side lying SLR  BFR 30,15,15, 15     " "       BFR SL hip bridge   BFR 30,15,15, 15            SL BOSU balance   30\" x4 20\" x4 30\" x4 30\" x4 4x30\" 4x30\"      SL BOSU eccentric taps   2x10 2x10 2x10 2x10 3x10    2x10 ea 2x10 ea   SL BOSU hip flexion    2x10 ea          SL RDL BOSU        4x5 5\"  2x10 5\" 3x10 5\"   Ther Ex       7/3       HEP education 10'             Slant board BLE 45 iso   10\" x10    Squat heels on foam beam 2x10  10\"x10     SL barbell hip flex isometric  30\" x4       30\" x5     Lateral step up 45 degree isometric  15\" x4    20\" x4        Step up   2x10 24\"   3x10 24\" 20\" 3x10       Split squat isometric w/ plantar flexion  15\" x3 15\" x5           SL RDL   2x10           Multi-directional walking [place]    X10 25# X10 25# X10  35# X10  35#        Slant board split squat    2x10 3x5 25#   2x10 25#      Sled pull/push     x10 x10 10 laps x10 x8 x10 x10   Wall sit      X2 till failure 25# X2 till failure 25# 2xF 25#       Heel elevated squat         3x10 25#      Landmine split and hack squat        25# 2x10 ea      Reverse nordic curls w/ assist         2x10 20#  2x10 20#   RDL         Unable     Lunge Matrix         X10 ea X10 ea X10 ea   SL leg press          3x15 85# 3x15 95#   Bike  5' 5' 5' 5' 5' 5' 5' 5' 5' 5'   Ther Activity                                          Gait Training                                          Modalities                                               "

## 2024-08-02 ENCOUNTER — OFFICE VISIT (OUTPATIENT)
Dept: OBGYN CLINIC | Facility: CLINIC | Age: 19
End: 2024-08-02
Payer: COMMERCIAL

## 2024-08-02 VITALS
SYSTOLIC BLOOD PRESSURE: 108 MMHG | HEIGHT: 73 IN | DIASTOLIC BLOOD PRESSURE: 70 MMHG | BODY MASS INDEX: 26.27 KG/M2 | HEART RATE: 66 BPM | WEIGHT: 198.2 LBS

## 2024-08-02 DIAGNOSIS — M17.11 OSTEOARTHRITIS OF RIGHT PATELLOFEMORAL JOINT: Primary | ICD-10-CM

## 2024-08-02 PROCEDURE — 99213 OFFICE O/P EST LOW 20 MIN: CPT | Performed by: ORTHOPAEDIC SURGERY

## 2024-08-02 NOTE — LETTER
August 2, 2024     Patient: Rivas Gtz  YOB: 2005  Date of Visit: 8/2/2024      To Whom it May Concern:    Rivas Gtz is under my professional care. Rivas was seen in my office on 8/2/2024. Rivas should avoid deep squatting over the next few weeks and avoid deep squatting past 90 degrees. May progress as tolerated.     If you have any questions or concerns, please don't hesitate to call.         Sincerely,          Steve Kwon,         CC: No Recipients

## 2024-08-02 NOTE — PROGRESS NOTES
Patient Name:  Rivas Gtz  MRN:  [id number]    Assessment & Plan     1. Osteoarthritis of right patellofemoral joint      Right knee mild patellofemoral osteoarthritis  MRI of right knee was reviewed in the office today.   It was discussed that patient has mild patellofemoral arthritis. There is no evidence of patella instability.   Treatment options were discussed including non surgical  treatment with continued physical therapy, home exercises, oral analgesic and injection therapy vs potential surgical intervention involving KAILASH procedure with failure of non operative treatment.   I recommend continued non operative treatment at this time. Conservative management will involve continued strengthening of the right knee. Patient is interested in returning to the gym at this time.   Patient is advised to avoid deep squatting for the next few weeks past 90 degrees. May progress as tolerated as strength improves.   If pain worsens, may consider PRP versus knee Toradol injection.   Patient will follow-up in 4 months for re-evaluation.       History of the Present Illness   Rivas Gtz is a 19 y.o. male with Right knee quadriceps weakness, bilateral knee patellar chondromalacia. He was last seen in the office on 7/15/2024 where patient was advised to continue with physical therapy and MRI of right knee was ordered. Patient presents today for MRI review.   Patient reports the right knee is burning today. Patient has had PRP injections in the right knee about one year ago that worsened symptoms.       Review of Systems     Review of Systems   Constitutional:  Negative for chills and diaphoresis.   HENT:  Negative for drooling, ear discharge, facial swelling, nosebleeds, rhinorrhea, sneezing and trouble swallowing.    Eyes:  Negative for discharge, redness and itching.   Respiratory:  Negative for cough, choking and wheezing.    Gastrointestinal:  Negative for abdominal distention and vomiting.   Endocrine:  "Negative for cold intolerance and heat intolerance.   Musculoskeletal:         See HPI and PE.    Skin:  Negative for rash.   Neurological:  Negative for facial asymmetry and speech difficulty.   Psychiatric/Behavioral:  Negative for agitation, behavioral problems and confusion.        Physical Exam     /70   Pulse 66   Ht 6' 1\" (1.854 m)   Wt 89.9 kg (198 lb 3.2 oz)   BMI 26.15 kg/m²     Right Knee  Range of motion from 0 to 130.    There is no effusion.    There is tenderness over the VMO, medial joint line.    Positive patella grind   The patient is able to perform a straight leg raise.    Varus stress testing reveals no instability at 0 and 30 degrees   Valgus stress testing reveals no instability at 0 and 30 degrees  The patient is neurovascular intact distally.      Data Review     I have personally reviewed pertinent films in PACS, and my interpretation follows.    MRI of right knee obtained on 7/23/2024 demonstrates mild patellofemoral osteoarthritis. Ligaments appear intact. Quadriceps and patellar tendons are intact.       Social History     Tobacco Use    Smoking status: Never    Smokeless tobacco: Never   Vaping Use    Vaping status: Never Used   Substance Use Topics    Alcohol use: Yes     Comment: soc    Drug use: Never           Procedures  None performed today.     Jaqueline Aiken PA-C     "

## 2024-09-11 ENCOUNTER — TELEPHONE (OUTPATIENT)
Age: 19
End: 2024-09-11

## 2024-09-11 NOTE — TELEPHONE ENCOUNTER
Caller: patient    Doctor: Doyle     Reason for call: would like a cb from Dr Kwon,  would not disclose his question, Patient is aware that he is not in office today.    Call back#: [phone]

## 2024-12-09 ENCOUNTER — OFFICE VISIT (OUTPATIENT)
Dept: OBGYN CLINIC | Facility: CLINIC | Age: 19
End: 2024-12-09
Payer: COMMERCIAL

## 2024-12-09 VITALS
HEIGHT: 73 IN | HEART RATE: 65 BPM | BODY MASS INDEX: 26.24 KG/M2 | WEIGHT: 198 LBS | DIASTOLIC BLOOD PRESSURE: 73 MMHG | SYSTOLIC BLOOD PRESSURE: 117 MMHG

## 2024-12-09 DIAGNOSIS — M22.41 CHONDROMALACIA OF RIGHT PATELLOFEMORAL JOINT: Primary | ICD-10-CM

## 2024-12-09 DIAGNOSIS — G89.29 CHRONIC PAIN OF RIGHT KNEE: ICD-10-CM

## 2024-12-09 DIAGNOSIS — M25.561 CHRONIC PAIN OF RIGHT KNEE: ICD-10-CM

## 2024-12-09 PROCEDURE — 99213 OFFICE O/P EST LOW 20 MIN: CPT | Performed by: ORTHOPAEDIC SURGERY

## 2024-12-09 RX ORDER — NAPROXEN 500 MG/1
500 TABLET ORAL 2 TIMES DAILY WITH MEALS
COMMUNITY

## 2024-12-09 NOTE — PROGRESS NOTES
Patient Name:  Rivas Gtz  MRN:  [id number]    Assessment & Plan     1. Chondromalacia of right patellofemoral joint  -     Ambulatory Referral to Orthopedic Surgery; Future  -     Durable Medical Equipment  2. Chronic pain of right knee        Right knee chondromalacia patella  Treatment options were discussed including non surgical  treatment with continued physical therapy, home exercises, bracing, oral analgesic and injection therapy.  Surgical intervention was discussed in the form of diagnostic arthroscopy to assess for any medial etiology of pain including occult meniscal tear or symptomatic plica, and consideration for staged KAILASH procedure with failure of non operative treatment, pending findings on diagnostic arthroscopy and symptoms on exam.  We did discuss that some of his complaints and exam do seem to be coming from patellofemoral pathology, though he currently has primary medial pain which seems independent of any patellofemoral issue.  The patient would like to hold off any surgical invention at this time.  The patient was offered a Toradol injection for diagnostic and therapeutic purposes today but declined.  The patient was offered a referral to Dr. Lozoya for evaluation second opinion on treatment options.  Referral was placed.  [facility] short arc quadricep strengthening.  Continue OTC analgesics as needed for symptom management  Patient was provided a true pull knee brace today  Follow up with me as needed      History of the Present Illness   Rivas Gtz is a 19 y.o. male with Right knee quadriceps weakness, bilateral knee patellar chondromalacia. He was last seen in the office on 8/2/2024 when he was encouraged to continue strengthening right lower extremity. Today the patient reports persistent right knee pain. He admits it is worse than his last visit. Pain in anterior and medial knee. No swelling. He is taking naproxen and  "voltaren gel as needed for symptom management without relief in symptoms. He has pain with leg extensions at the gym. He admits he has not tried k-taping or bracing.      Review of Systems     Review of Systems   Constitutional:  Negative for chills and diaphoresis.   HENT:  Negative for drooling, ear discharge, facial swelling, nosebleeds, rhinorrhea, sneezing and trouble swallowing.    Eyes:  Negative for discharge, redness and itching.   Respiratory:  Negative for cough, choking and wheezing.    Gastrointestinal:  Negative for abdominal distention and vomiting.   Endocrine: Negative for cold intolerance and heat intolerance.   Musculoskeletal:         See HPI and PE.    Skin:  Negative for rash.   Neurological:  Negative for facial asymmetry and speech difficulty.   Psychiatric/Behavioral:  Negative for agitation, behavioral problems and confusion.        Physical Exam     /73   Pulse 65   Ht 6' 1\" (1.854 m)   Wt 89.8 kg (198 lb)   BMI 26.12 kg/m²     Right Knee  Range of motion from 0 to 130.    There is no effusion.    There is tenderness over the anteromedial joint line, medial femoral condyle    Positive patella grind   The patient is able to perform a straight leg raise.    Varus stress testing reveals no instability at 0 and 30 degrees   Valgus stress testing reveals no instability at 0 and 30 degrees  Negative Isha's  The patient is neurovascular intact distally.       Data Review     I have personally reviewed pertinent films in PACS, and my interpretation follows.    MRI of right knee obtained on 7/23/2024 demonstrates partial-thickness chondral thinning of the medial patella.  Ligaments intact.  No obvious meniscal tear present.  Quadriceps and patellar tendons are intact.     No new images today    Social History     Tobacco Use    Smoking status: Never    Smokeless tobacco: Never   Vaping Use    Vaping status: Never Used   Substance Use Topics    Alcohol use: Yes     Comment: soc    Drug " use: Never           Procedures  None performed today.     Steve Kwon DO   Scribe Attestation      I,:  Faby Quezada am acting as a scribe while in the presence of the attending physician.:       I,:  Steve Kwon,  personally performed the services described in this documentation    as scribed in my presence.:

## 2025-01-03 ENCOUNTER — OFFICE VISIT (OUTPATIENT)
Age: 20
End: 2025-01-03
Payer: COMMERCIAL

## 2025-01-03 VITALS — WEIGHT: 198 LBS | BODY MASS INDEX: 26.24 KG/M2 | HEIGHT: 73 IN

## 2025-01-03 DIAGNOSIS — L60.0 INGROWN TOENAIL OF LEFT FOOT: Primary | ICD-10-CM

## 2025-01-03 PROCEDURE — 11750 EXCISION NAIL&NAIL MATRIX: CPT | Performed by: STUDENT IN AN ORGANIZED HEALTH CARE EDUCATION/TRAINING PROGRAM

## 2025-01-03 PROCEDURE — 99203 OFFICE O/P NEW LOW 30 MIN: CPT | Performed by: STUDENT IN AN ORGANIZED HEALTH CARE EDUCATION/TRAINING PROGRAM

## 2025-01-03 NOTE — PROGRESS NOTES
"[facility] Office Visit    ASSESSMENT     Diagnoses and all orders for this visit:    Ingrown toenail of left foot  -     Nail removal         Problem List Items Addressed This Visit    None  Visit Diagnoses         Ingrown toenail of left foot    -  Primary    Relevant Orders    Nail removal          PLAN  -Nail avulsion performed as below:  -Nail bed was dressed with bacitracin, DSD, 1 inch Coban  -Patient instructed to take bandage off in 24 hours, wash area lightly with warm soap and water, dry area thoroughly, apply bacitracin and Band-Aid daily x10 days.  -Patient instructed to call our office for an earlier appointment should any extreme pain, redness, swelling, purulent drainage present.    Nail removal    Date/Time: 1/3/2025 8:45 AM    Performed by: Nino Marina DPM  Authorized by: Nino Marina DPM    Patient location:  Clinic  Indications / Diagnosis:  Ingrown toenail  Universal Protocol:  procedure performed by consultantConsent: Verbal consent obtained.  Risks and benefits: risks, benefits and alternatives were discussed  Consent given by: patient  Time out: Immediately prior to procedure a \"time out\" was called to verify the correct patient, procedure, equipment, support staff and site/side marked as required.  Patient understanding: patient states understanding of the procedure being performed  Site marked: the operative site was marked  Patient identity confirmed: verbally with patient    Location:     Foot:  L big toe  Pre-procedure details:     Skin preparation:  Alcohol and Betadine  Anesthesia (see MAR for exact dosages):     Anesthesia method:  Local infiltration    Local anesthetic:  Lidocaine 1% w/o epi  Nail Removal:     Nail removed:  Partial    Nail side:  Lateral    Nail bed sutured: no    Ingrown nail:     Wedge excision of skin: no      Nail matrix removed or ablated:  Partial  Post-procedure details:     Dressing:  4x4 sterile gauze, antibiotic ointment " "and gauze roll    Patient tolerance of procedure:  Tolerated well, no immediate complications         SUBJECTIVE    Chief Complaint:  Bilateral big toe pain     Patient ID: Rivas Hathaway is a pleasant 19 year old male who presents today for an ingrown toenail on both of his great toes. He has been dealing with them for years. 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 He states that the left great toenail is worse on his left foot. He states that a few weeks ago his left toenail was red, bloody and filled with puss.         The following portions of the patient's history were reviewed and updated as appropriate: allergies, current medications, past family history, past medical history, past social history, past surgical history and problem list.    Review of Systems   Constitutional: Negative.    Respiratory: Negative.     Cardiovascular: Negative.    Gastrointestinal: Negative.    Genitourinary: Negative.    Musculoskeletal: Negative.    Skin: Negative.          OBJECTIVE      Ht 6' 1\" (1.854 m)   Wt 89.8 kg (198 lb)   BMI 26.12 kg/m²        Physical Exam  Constitutional:       Appearance: Normal appearance.   HENT:      Head: Normocephalic and atraumatic.   Eyes:      General:         Right eye: No discharge.         Left eye: No discharge.   Cardiovascular:      Rate and Rhythm: Normal rate and regular rhythm.      Pulses:           Dorsalis pedis pulses are 2+ on the right side and 2+ on the left side.        Posterior tibial pulses are 2+ on the right side and 2+ on the left side.   Pulmonary:      Effort: Pulmonary effort is normal.      Breath sounds: Normal breath sounds.   Feet:      Left foot:      Toenail Condition: Left toenails are ingrown.      Comments: Ingrown left hallux lateral nail border  Skin:     General: Skin is warm.      Capillary Refill: Capillary refill takes less than 2 seconds.   Neurological:      Mental Status: He is alert and oriented to " person, place, and time.      Sensory: Sensation is intact. No sensory deficit.   Psychiatric:         Mood and Affect: Mood normal.

## 2025-01-13 ENCOUNTER — OFFICE VISIT (OUTPATIENT)
Dept: OBGYN CLINIC | Facility: MEDICAL CENTER | Age: 20
End: 2025-01-13
Payer: COMMERCIAL

## 2025-01-13 VITALS — WEIGHT: 200 LBS | BODY MASS INDEX: 26.51 KG/M2 | HEIGHT: 73 IN

## 2025-01-13 DIAGNOSIS — M22.41 CHONDROMALACIA OF RIGHT PATELLOFEMORAL JOINT: ICD-10-CM

## 2025-01-13 PROCEDURE — 99213 OFFICE O/P EST LOW 20 MIN: CPT | Performed by: ORTHOPAEDIC SURGERY

## 2025-01-13 NOTE — LETTER
January 13, 2025     Patient: Rivas Gtz  YOB: 2005  Date of Visit: 1/13/2025      To Whom it May Concern:    Rivas Gtz is under my professional care. Rivas was seen in my office on 1/13/2025. Rivas is cleared to return to all activity, he has no formal restrictions.     If you have any questions or concerns, please don't hesitate to call.         Sincerely,          Olivier Lozoya DO        CC: No Recipients

## 2025-01-13 NOTE — PROGRESS NOTES
Ortho Sports Medicine Knee New Patient Visit     Assesment:   19 y.o. male right knee partial thickness chondral defect of the medial facet of the patella     Plan:    Conservative treatment:    Discussed at length with the patient that his MRI does demonstrate partial-thickness cartilage loss of the medial facet of the patella but ultimately I would recommend holding off on any surgical intervention of the knee at this time.  Instructed that I would recommend that the patient return to all of his activities of daily living.  Instructed that there is no reason for him to have any restrictions at this time.  I heavily counseled that I would recommend that he continue to perform physical therapy as well as home exercise program specifically working on his hip core and glutes strengthening for patella stability.  Instructed that if he returns all of his activities of daily living and continues to have significant pain then I would recommend looking into surgical intervention of second stage [place] implantation of the patella.  Instructed that in order to implant his autologous cartilage I would have to take down to the current cartilage that he does have in place and that there is risk of the implant failing and then him having no cartilage of the medial facet of the patella.  Instructed that I would recommend starting lower leg strengthening but with high reps and low weight and progress himself up to adding weighted exercise.  A note was placed in his chart stating that he is released to full activities and no formal restrictions.  Instructed that he can continue to wear his patellar stabilizing brace but ultimately I do not see benefit because he has never had a patellar dislocation or experiencing patellar instability.  Patient denies having any benefit from this brace.  Ice to knee for 20 minutes at least 1-2 times daily.  Continue PT at this time-specifically to focus on hip core and glutes strengthening  OTC  NSAIDS prn for pain.    Imaging:    All imaging from today was reviewed by myself and explained to the patient.       Injection:    We did discuss cortisone versus viscosupplementation injections but will hold off at this time.  Patient has tried PRP injections without any improvement.      Surgery:     No surgery is recommended at this point, continue with conservative treatment plan as noted.      Follow up:    Return in about 3 months (around 4/13/2025) for Recheck.        Chief Complaint   Patient presents with    Right Knee - Pain     Knee brace not helping.        History of Present Illness:    The patient is a 19 y.o. male whose occupation is [employer] employee, referred to me by  regarding Right knee chondromalacia of the patella second opinion.    Pain is located anterior.  The patient rates the pain as a 8/10.  The pain has been present for 2 years.      The patient reports that he was in the [employer] 2 years ago and was performing active training in which she was performing a lot of running at the time.  Patient was experiencing knee pain and sought medical attention through the [employer].  MRI imaging was obtained that demonstrated a medial meniscus tear.  Patient states that then when they went in to perform medial meniscus repair that there was no meniscus tear but it was seen that his cartilage was thin on his patella and Lori biopsy was performed at that time.  Biopsy was performed at [facility] in [place] in April 2023.  Patient reports that since that time he has tried conservative treatment options such as PRP injection without improvement and physical therapy and bracing.  Patient states that he continues to have pain specifically about the anterior medial aspect of the knee.  Patient states that he is unable to run or squat or perform really any lower leg extension or flexion without experiencing significant pain.  Patient states that he is currently in physical therapy in which they are  working on electroshock therapy and this therapy is through the [facility].  Patient reports that he continues to have dull achy pain and at times sharp stabbing pain about the anterior aspect of the knee.  Patient denies ever having a patellar dislocation or experiencing patellar instability.  Patient reports that he was able to run 6 miles a day at an 8-minute pace and now is unable to run at all due to significant pain.      Pain is improved by rest.  Pain is aggravated by stairs, squatting, running, standing, and pivoting on a planted foot.    Symptoms include clicking, catching, and swelling.     The patient has tried rest, ice, NSAIDS, physical therapy, bracing, and PRP injection.            Knee Surgical History:  April 2023- Right knee KAILASH biopsy at [facility]    Past Medical, Social and Family History:  History reviewed. No pertinent past medical history.  Past Surgical History:   Procedure Laterality Date    KNEE SURGERY Right 04/19/2023     No Known Allergies  Current Outpatient Medications on File Prior to Visit   Medication Sig Dispense Refill    Diclofenac Sodium (VOLTAREN) 1 % Apply 2 g topically 4 (four) times a day      naproxen (EC NAPROSYN) 500 MG EC tablet Take 500 mg by mouth 2 (two) times a day with meals       No current facility-administered medications on file prior to visit.     Social History     Socioeconomic History    Marital status: Single     Spouse name: Not on file    Number of children: Not on file    Years of education: Not on file    Highest education level: Not on file   Occupational History    Not on file   Tobacco Use    Smoking status: Never    Smokeless tobacco: Never   Vaping Use    Vaping status: Never Used   Substance and Sexual Activity    Alcohol use: Yes     Comment: soc    Drug use: Never    Sexual activity: Not on file   Other Topics Concern    Not on file   Social History Narrative    Not on file     Social Drivers of Health     Financial Resource  "Strain: Not on file   Food Insecurity: Not on file   Transportation Needs: Not on file   Physical Activity: Not on file   Stress: Not on file   Social Connections: Not on file   Intimate Partner Violence: Not on file   Housing Stability: Not on file         I have reviewed the past medical, surgical, social and family history, medications and allergies as documented in the EMR.    Review of systems: ROS is negative other than that noted in the HPI.  Constitutional: Negative for fatigue and fever.   HENT: Negative for sore throat.    Respiratory: Negative for shortness of breath.    Cardiovascular: Negative for chest pain.   Gastrointestinal: Negative for abdominal pain.   Endocrine: Negative for cold intolerance and heat intolerance.   Genitourinary: Negative for flank pain.   Musculoskeletal: Negative for back pain.   Skin: Negative for rash.   Allergic/Immunologic: Negative for immunocompromised state.   Neurological: Negative for dizziness.   Psychiatric/Behavioral: Negative for agitation.      Physical Exam:    Height 6' 1\" (1.854 m), weight 90.7 kg (200 lb).    General/Constitutional: NAD, well developed, well nourished  HENT: Normocephalic, atraumatic  CV: Intact distal pulses, regular rate  Resp: No respiratory distress or labored breathing  GI: Soft and non-tender   Lymphatic: No lymphadenopathy palpated  Neuro: Alert and Oriented x 3, no focal deficits  Psych: Normal mood, normal affect, normal judgement, normal behavior  Skin: Warm, dry, no rashes, no erythema      Knee Exam (focused):                RIGHT LEFT   ROM:   0-130 0-130   Palpation: Effusion negative negative     MJL tenderness Negative Negative     LJL tenderness Negative Negative   Meniscus: Isha Negative Negative    Apley's Compression Negative Negative   Instability: Varus stable stable     Valgus stable stable   Special Tests: Lachman Negative Negative     Posterior drawer Negative Negative     Anterior drawer Negative Negative     " Pivot shift not tested not tested     Dial not tested not tested   Patella: Palpation Ttp medial facet  no tenderness     Mobility 1/4 1/4     Apprehension Negative Negative   Other: Single leg 1/4 squat not tested not tested      LE NV Exam: +2 DP/PT pulses bilaterally  Sensation intact to light touch L2-S1 bilaterally     Bilateral hip ROM demonstrates no pain actively or passively    No calf tenderness to palpation bilaterally    Knee Imaging    X-rays of the right knee were reviewed, which demonstrate no acute fracture or osseous abnormality.  I have reviewed the radiology report and agree with their impression.    MRI of the right knee were reviewed, which demonstrate with focal cartilage thinning of the medial facet of the patella.  I have reviewed the radiology report and agree with their impression.

## 2025-03-07 ENCOUNTER — OFFICE VISIT (OUTPATIENT)
Age: 20
End: 2025-03-07
Payer: COMMERCIAL

## 2025-03-07 VITALS — BODY MASS INDEX: 26.51 KG/M2 | WEIGHT: 200 LBS | HEIGHT: 73 IN

## 2025-03-07 DIAGNOSIS — L60.0 INGROWN TOENAIL OF RIGHT FOOT: Primary | ICD-10-CM

## 2025-03-07 DIAGNOSIS — B07.0 PLANTAR WART, LEFT FOOT: ICD-10-CM

## 2025-03-07 PROCEDURE — 99213 OFFICE O/P EST LOW 20 MIN: CPT | Performed by: STUDENT IN AN ORGANIZED HEALTH CARE EDUCATION/TRAINING PROGRAM

## 2025-03-07 PROCEDURE — 17110 DESTRUCTION B9 LES UP TO 14: CPT | Performed by: STUDENT IN AN ORGANIZED HEALTH CARE EDUCATION/TRAINING PROGRAM

## 2025-03-07 PROCEDURE — 11750 EXCISION NAIL&NAIL MATRIX: CPT | Performed by: STUDENT IN AN ORGANIZED HEALTH CARE EDUCATION/TRAINING PROGRAM

## 2025-03-07 NOTE — PROGRESS NOTES
"[facility] Podiatric Medicine and Surgery Office Visit    ASSESSMENT     Diagnoses and all orders for this visit:    Ingrown toenail of right foot  -     Nail removal    Plantar wart, left foot  -     Lesion Destruction           Problem List Items Addressed This Visit    None  Visit Diagnoses         Ingrown toenail of right foot    -  Primary    Relevant Orders    Nail removal      Plantar wart, left foot        Relevant Orders    Lesion Destruction            PLAN  -Nail avulsion performed as below:  -Nail bed was dressed with bacitracin, DSD, 1 inch Coban  -Patient instructed to take bandage off in 24 hours, wash area lightly with warm soap and water, dry area thoroughly, apply bacitracin and Band-Aid daily x10 days.  -Patient was educated regarding their condition.  -The verruca was first debrided to patient tolerance with a #15-blade, Cantharone was then applied and covered with a band-aid  -Patient was instructed to cleanse their left foot after 12-24 hours with soap and water. They were educated that a blister to the area may form, this is normal  -RTC in 2-weeks      Nail removal    Date/Time: 3/7/2025 10:00 AM    Performed by: Nino Marina DPM  Authorized by: Nino Marina DPM    Patient location:  Clinic  Indications / Diagnosis:  Ingrown toenail  Universal Protocol:  procedure performed by consultantConsent: Verbal consent obtained.  Risks and benefits: risks, benefits and alternatives were discussed  Consent given by: patient  Time out: Immediately prior to procedure a \"time out\" was called to verify the correct patient, procedure, equipment, support staff and site/side marked as required.  Patient understanding: patient states understanding of the procedure being performed  Site marked: the operative site was marked  Patient identity confirmed: verbally with patient    Location:     Foot:  R big toe  Pre-procedure details:     Skin preparation:  Alcohol and Betadine  Anesthesia (see MAR for exact " "dosages):     Anesthesia method:  Local infiltration    Local anesthetic:  Lidocaine 1% w/o epi  Nail Removal:     Nail removed:  Partial    Nail side:  Lateral    Nail bed sutured: no    Ingrown nail:     Wedge excision of skin: no      Nail matrix removed or ablated:  Partial  Post-procedure details:     Dressing:  4x4 sterile gauze, antibiotic ointment and gauze roll    Patient tolerance of procedure:  Tolerated well, no immediate complications  Lesion Destruction    Date/Time: 3/7/2025 10:00 AM    Performed by: Nino Marina DPM  Authorized by: Nino Marina DPM  Universal Protocol:  procedure performed by consultantConsent: Verbal consent obtained.  Risks and benefits: risks, benefits and alternatives were discussed  Consent given by: patient  Time out: Immediately prior to procedure a \"time out\" was called to verify the correct patient, procedure, equipment, support staff and site/side marked as required.  Patient understanding: patient states understanding of the procedure being performed  Patient identity confirmed: verbally with patient    Procedure Details - Lesion Destruction:     Number of Lesions:  1  Lesion 1:     Body area:  Lower extremity    Lower extremity location:  L foot    Malignancy: benign lesion      Destruction method: chemical removal           SUBJECTIVE    Chief Complaint:  Bilateral big toe pain     Patient ID: Rivas Hathaawy is a pleasant 19 year old male who presents today for an ingrown toenail on both of his great toes. He has been dealing with them for years. 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 He states that the left great toenail is worse on his left foot. He states that a few weeks ago his left toenail was red, bloody and filled with puss.     3/7/2025: Rivas is overall doing well today. He is now here to have his right ingrown toenail cut. He also mentions that he has a small wart on the bottom of his left foot that is " "causing him some discomfort.         The following portions of the patient's history were reviewed and updated as appropriate: allergies, current medications, past family history, past medical history, past social history, past surgical history and problem list.    Review of Systems   Constitutional: Negative.    Respiratory: Negative.     Cardiovascular: Negative.    Gastrointestinal: Negative.    Genitourinary: Negative.    Musculoskeletal: Negative.    Skin: Negative.          OBJECTIVE      Ht 6' 1\" (1.854 m)   Wt 90.7 kg (200 lb)   BMI 26.39 kg/m²        Physical Exam  Constitutional:       Appearance: Normal appearance.   HENT:      Head: Normocephalic and atraumatic.   Eyes:      General:         Right eye: No discharge.         Left eye: No discharge.   Cardiovascular:      Rate and Rhythm: Normal rate and regular rhythm.      Pulses:           Dorsalis pedis pulses are 2+ on the right side and 2+ on the left side.        Posterior tibial pulses are 2+ on the right side and 2+ on the left side.   Pulmonary:      Effort: Pulmonary effort is normal.      Breath sounds: Normal breath sounds.   Feet:      Left foot:      Toenail Condition: Left toenails are ingrown.      Comments: Ingrown toenail noted of the right hallux, lateral nail border with surrounding erythema and edema.    There is a plantar wart noted to the left plantar foot just medial to the 5th metatarsal base. Pain on palpation and with side to side compression of this area.   Skin:     General: Skin is warm.      Capillary Refill: Capillary refill takes less than 2 seconds.   Neurological:      Mental Status: He is alert and oriented to person, place, and time.      Sensory: Sensation is intact. No sensory deficit.   Psychiatric:         Mood and Affect: Mood normal.               "

## 2025-03-28 ENCOUNTER — OFFICE VISIT (OUTPATIENT)
Age: 20
End: 2025-03-28
Payer: COMMERCIAL

## 2025-03-28 VITALS — HEIGHT: 73 IN | WEIGHT: 200 LBS | BODY MASS INDEX: 26.51 KG/M2

## 2025-03-28 DIAGNOSIS — L60.0 INGROWN TOENAIL OF LEFT FOOT: Primary | ICD-10-CM

## 2025-03-28 DIAGNOSIS — B07.0 PLANTAR WART, LEFT FOOT: ICD-10-CM

## 2025-03-28 PROCEDURE — 11750 EXCISION NAIL&NAIL MATRIX: CPT | Performed by: STUDENT IN AN ORGANIZED HEALTH CARE EDUCATION/TRAINING PROGRAM

## 2025-03-28 PROCEDURE — 99213 OFFICE O/P EST LOW 20 MIN: CPT | Performed by: STUDENT IN AN ORGANIZED HEALTH CARE EDUCATION/TRAINING PROGRAM

## 2025-03-28 PROCEDURE — 17110 DESTRUCTION B9 LES UP TO 14: CPT | Performed by: STUDENT IN AN ORGANIZED HEALTH CARE EDUCATION/TRAINING PROGRAM

## 2025-03-28 NOTE — PROGRESS NOTES
"[facility] Podiatric Medicine and Surgery Office Visit    ASSESSMENT     Diagnoses and all orders for this visit:    Ingrown toenail of left foot  -     Nail removal    Plantar wart, left foot  -     Lesion Destruction             Problem List Items Addressed This Visit    None  Visit Diagnoses         Ingrown toenail of left foot    -  Primary    Relevant Orders    Nail removal      Plantar wart, left foot        Relevant Orders    Lesion Destruction              PLAN  -Nail avulsion performed as below:  -Nail bed was dressed with bacitracin, DSD, 1 inch Coban  -Patient instructed to take bandage off in 24 hours, wash area lightly with warm soap and water, dry area thoroughly, apply bacitracin and Band-Aid daily x10 days.  -Patient was educated regarding their condition.  -The verruca was first debrided to patient tolerance with a #15-blade, Cantharone was then applied and covered with a band-aid  -Patient was instructed to cleanse their left foot after 12-24 hours with soap and water. They were educated that a blister to the area may form, this is normal  -RTC in 2-weeks      Nail removal    Date/Time: 3/28/2025 10:00 AM    Performed by: Nino Marina DPM  Authorized by: Nino Marina DPM    Patient location:  Clinic  Indications / Diagnosis:  Ingrown toenail  Universal Protocol:  procedure performed by consultantConsent: Verbal consent obtained.  Risks and benefits: risks, benefits and alternatives were discussed  Consent given by: patient  Time out: Immediately prior to procedure a \"time out\" was called to verify the correct patient, procedure, equipment, support staff and site/side marked as required.  Patient understanding: patient states understanding of the procedure being performed  Site marked: the operative site was marked  Patient identity confirmed: verbally with patient    Location:     Foot:  L big toe  Pre-procedure details:     Skin preparation:  Alcohol and Betadine  Anesthesia (see MAR for exact " "dosages):     Anesthesia method:  Local infiltration    Local anesthetic:  Lidocaine 1% w/o epi  Nail Removal:     Nail removed:  Partial    Nail side:  Medial    Nail bed sutured: no    Ingrown nail:     Wedge excision of skin: no      Nail matrix removed or ablated:  Partial  Post-procedure details:     Dressing:  4x4 sterile gauze, antibiotic ointment and gauze roll    Patient tolerance of procedure:  Tolerated well, no immediate complications  Lesion Destruction    Date/Time: 3/28/2025 10:00 AM    Performed by: Nino Marina DPM  Authorized by: Nino Marina DPM  Universal Protocol:  procedure performed by consultantConsent: Verbal consent obtained.  Risks and benefits: risks, benefits and alternatives were discussed  Consent given by: patient  Time out: Immediately prior to procedure a \"time out\" was called to verify the correct patient, procedure, equipment, support staff and site/side marked as required.  Patient understanding: patient states understanding of the procedure being performed  Patient identity confirmed: verbally with patient    Procedure Details - Lesion Destruction:     Number of Lesions:  1  Lesion 1:     Body area:  Lower extremity    Lower extremity location:  L foot    Malignancy: benign lesion      Destruction method: chemical removal           SUBJECTIVE    Chief Complaint:  Bilateral big toe pain     Patient ID: Rivas Hathaway is a pleasant 19 year old male who presents today for an ingrown toenail on both of his great toes. He has been dealing with them for years. 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 He states that the left great toenail is worse on his left foot. He states that a few weeks ago his left toenail was red, bloody and filled with puss.     3/7/2025: Rivas is overall doing well today. He is now here to have his right ingrown toenail cut. He also mentions that he has a small wart on the bottom of his left foot that is " "causing him some discomfort.     3/28/2025: Rivas is doing well today. He is hoping to get his left great toe ingrown toenail removed today. It is still causing him some pain and redness. As far as his plantar wart on his left foot has become slightly better.         The following portions of the patient's history were reviewed and updated as appropriate: allergies, current medications, past family history, past medical history, past social history, past surgical history and problem list.    Review of Systems   Constitutional: Negative.    Respiratory: Negative.     Cardiovascular: Negative.    Gastrointestinal: Negative.    Genitourinary: Negative.    Musculoskeletal: Negative.    Skin: Negative.          OBJECTIVE      Ht 6' 1\" (1.854 m)   Wt 90.7 kg (200 lb)   BMI 26.39 kg/m²        Physical Exam  Constitutional:       Appearance: Normal appearance.   HENT:      Head: Normocephalic and atraumatic.   Eyes:      General:         Right eye: No discharge.         Left eye: No discharge.   Cardiovascular:      Rate and Rhythm: Normal rate and regular rhythm.      Pulses:           Dorsalis pedis pulses are 2+ on the right side and 2+ on the left side.        Posterior tibial pulses are 2+ on the right side and 2+ on the left side.   Pulmonary:      Effort: Pulmonary effort is normal.      Breath sounds: Normal breath sounds.   Feet:      Left foot:      Toenail Condition: Left toenails are ingrown.      Comments: Ingrown toenail noted of the left hallux, medial nail border with surrounding erythema and edema.    There is a plantar wart noted to the left plantar foot just medial to the 5th metatarsal base. Pain on palpation and with side to side compression of this area.   Skin:     General: Skin is warm.      Capillary Refill: Capillary refill takes less than 2 seconds.   Neurological:      Mental Status: He is alert and oriented to person, place, and time.      Sensory: Sensation is intact. No sensory deficit. "   Psychiatric:         Mood and Affect: Mood normal.

## 2025-04-11 ENCOUNTER — OFFICE VISIT (OUTPATIENT)
Age: 20
End: 2025-04-11
Payer: COMMERCIAL

## 2025-04-11 VITALS — BODY MASS INDEX: 26.51 KG/M2 | HEIGHT: 73 IN | WEIGHT: 200 LBS

## 2025-04-11 DIAGNOSIS — B07.0 PLANTAR WART, LEFT FOOT: Primary | ICD-10-CM

## 2025-04-11 PROCEDURE — 99212 OFFICE O/P EST SF 10 MIN: CPT | Performed by: STUDENT IN AN ORGANIZED HEALTH CARE EDUCATION/TRAINING PROGRAM

## 2025-04-11 PROCEDURE — 17110 DESTRUCTION B9 LES UP TO 14: CPT | Performed by: STUDENT IN AN ORGANIZED HEALTH CARE EDUCATION/TRAINING PROGRAM

## 2025-04-12 NOTE — PROGRESS NOTES
"[facility] Podiatric Medicine and Surgery Office Visit    ASSESSMENT     Diagnoses and all orders for this visit:    Plantar wart, left foot    Other orders  -     Lesion Destruction               Problem List Items Addressed This Visit    None  Visit Diagnoses         Plantar wart, left foot    -  Primary                PLAN  -Patient was educated regarding their condition.  -The verruca was first debrided to patient tolerance with a #15-blade, Cantharone was then applied and covered with a band-aid  -Patient was instructed to cleanse their left foot after 12-24 hours with soap and water. They were educated that a blister to the area may form, this is normal  -RTC in 2-weeks      Lesion Destruction    Date/Time: 4/11/2025 10:15 AM    Performed by: Nino Marina DPM  Authorized by: Nino Marina DPM  Universal Protocol:  procedure performed by consultantConsent: Verbal consent obtained.  Risks and benefits: risks, benefits and alternatives were discussed  Consent given by: patient  Time out: Immediately prior to procedure a \"time out\" was called to verify the correct patient, procedure, equipment, support staff and site/side marked as required.  Patient understanding: patient states understanding of the procedure being performed  Patient identity confirmed: verbally with patient    Procedure Details - Lesion Destruction:     Number of Lesions:  1  Lesion 1:     Body area:  Lower extremity    Lower extremity location:  L foot    Malignancy: benign lesion      Destruction method: chemical removal           SUBJECTIVE    Chief Complaint:  Bilateral big toe pain     Patient ID: Rivas Gtz     4/11/2025: Rivas presents today for follow-up regarding his left foot plantar wart.  He states that they are doing well.        The following portions of the patient's history were reviewed and updated as appropriate: allergies, current medications, past family history, past medical history, past social history, past " "surgical history and problem list.    Review of Systems   Constitutional: Negative.    Respiratory: Negative.     Cardiovascular: Negative.    Gastrointestinal: Negative.    Genitourinary: Negative.    Musculoskeletal: Negative.    Skin: Negative.          OBJECTIVE      Ht 6' 1\" (1.854 m)   Wt 90.7 kg (200 lb)   BMI 26.39 kg/m²        Physical Exam  Constitutional:       Appearance: Normal appearance.   HENT:      Head: Normocephalic and atraumatic.   Eyes:      General:         Right eye: No discharge.         Left eye: No discharge.   Cardiovascular:      Rate and Rhythm: Normal rate and regular rhythm.      Pulses:           Dorsalis pedis pulses are 2+ on the right side and 2+ on the left side.        Posterior tibial pulses are 2+ on the right side and 2+ on the left side.   Pulmonary:      Effort: Pulmonary effort is normal.      Breath sounds: Normal breath sounds.   Feet:      Left foot:      Toenail Condition: Left toenails are ingrown.      Comments: There is a plantar wart noted to the left plantar foot just medial to the 5th metatarsal base. Pain on palpation and with side to side compression of this area.   Skin:     General: Skin is warm.      Capillary Refill: Capillary refill takes less than 2 seconds.   Neurological:      Mental Status: He is alert and oriented to person, place, and time.      Sensory: Sensation is intact. No sensory deficit.   Psychiatric:         Mood and Affect: Mood normal.               "

## 2025-04-21 VITALS — HEIGHT: 73 IN | WEIGHT: 192 LBS | BODY MASS INDEX: 25.45 KG/M2

## 2025-04-21 DIAGNOSIS — M22.41 CHONDROMALACIA OF RIGHT PATELLOFEMORAL JOINT: Primary | ICD-10-CM

## 2025-04-21 PROCEDURE — 99213 OFFICE O/P EST LOW 20 MIN: CPT | Performed by: ORTHOPAEDIC SURGERY

## 2025-04-21 NOTE — PROGRESS NOTES
Ortho Sports Medicine Knee New Patient Visit     Assesment:   20 y.o. male right knee partial thickness chondral defect of the medial facet of the patella     Plan:    Conservative treatment:    PT and strengthening exercises    Imaging:    All imaging from today was reviewed by myself and explained to the patient.       Injection:    We did discuss cortisone versus viscosupplementation injections but will hold off at this time.  Patient has tried PRP injections without any improvement.      Surgery:     No surgery is recommended at this point, continue with conservative treatment plan as noted.      Follow up:    No follow-ups on file.        Chief Complaint   Patient presents with    Right Knee - Follow-up       History of Present Illness:    The patient is a 20 y.o. male whose occupation is [employer] employee, referred to me by  regarding Right knee chondromalacia of the patella second opinion.    Pain is located anterior.  The patient rates the pain as a 8/10.  The pain has been present for 2 years.      The patient reports that he was in the [employer] 2 years ago and was performing active training in which she was performing a lot of running at the time.  Patient was experiencing knee pain and sought medical attention through the [employer].  MRI imaging was obtained that demonstrated a medial meniscus tear.  Patient states that then when they went in to perform medial meniscus repair that there was no meniscus tear but it was seen that his cartilage was thin on his patella and Lori biopsy was performed at that time.  Biopsy was performed at [facility] in [place] in April 2023.  Patient reports that since that time he has tried conservative treatment options such as PRP injection without improvement and physical therapy and bracing.  Patient states that he continues to have pain specifically about the anterior medial aspect of the knee.  Patient states that he is unable to run or squat or perform really any  lower leg extension or flexion without experiencing significant pain.  Patient states that he is currently in physical therapy in which they are working on electroshock therapy and this therapy is through the [facility].  Patient reports that he continues to have dull achy pain and at times sharp stabbing pain about the anterior aspect of the knee.  Patient denies ever having a patellar dislocation or experiencing patellar instability.  Patient reports that he was able to run 6 miles a day at an 8-minute pace and now is unable to run at all due to significant pain.      Pain is improved by rest.  Pain is aggravated by stairs, squatting, running, standing, and pivoting on a planted foot.    Symptoms include clicking, catching, and swelling.     The patient has tried rest, ice, NSAIDS, physical therapy, bracing, and PRP injection.      He notes pain throughout the knee.     He has access to the gym.  He notes lifting weights worsens his knee pain.  No new injury.        Knee Surgical History:  April 2023- Right knee KAILASH biopsy at [facility]    Past Medical, Social and Family History:  History reviewed. No pertinent past medical history.  Past Surgical History:   Procedure Laterality Date    KNEE SURGERY Right 04/19/2023     No Known Allergies  Current Outpatient Medications on File Prior to Visit   Medication Sig Dispense Refill    Diclofenac Sodium (VOLTAREN) 1 % Apply 2 g topically 4 (four) times a day (Patient not taking: Reported on 4/11/2025)      naproxen (EC NAPROSYN) 500 MG EC tablet Take 500 mg by mouth 2 (two) times a day with meals (Patient not taking: Reported on 4/11/2025)       No current facility-administered medications on file prior to visit.     Social History     Socioeconomic History    Marital status: Single     Spouse name: Not on file    Number of children: Not on file    Years of education: Not on file    Highest education level: Not on file   Occupational History    Not on file  "  Tobacco Use    Smoking status: Never    Smokeless tobacco: Never   Vaping Use    Vaping status: Never Used   Substance and Sexual Activity    Alcohol use: Yes     Comment: soc    Drug use: Never    Sexual activity: Not on file   Other Topics Concern    Not on file   Social History Narrative    Not on file     Social Drivers of Health     Financial Resource Strain: Not on file   Food Insecurity: Not on file   Transportation Needs: Not on file   Physical Activity: Not on file   Stress: Not on file   Social Connections: Not on file   Intimate Partner Violence: Not on file   Housing Stability: Not on file         I have reviewed the past medical, surgical, social and family history, medications and allergies as documented in the EMR.    Review of systems: ROS is negative other than that noted in the HPI.  Constitutional: Negative for fatigue and fever.   HENT: Negative for sore throat.    Respiratory: Negative for shortness of breath.    Cardiovascular: Negative for chest pain.   Gastrointestinal: Negative for abdominal pain.   Endocrine: Negative for cold intolerance and heat intolerance.   Genitourinary: Negative for flank pain.   Musculoskeletal: Negative for back pain.   Skin: Negative for rash.   Allergic/Immunologic: Negative for immunocompromised state.   Neurological: Negative for dizziness.   Psychiatric/Behavioral: Negative for agitation.      Physical Exam:    Height 6' 1\" (1.854 m), weight 87.1 kg (192 lb).    General/Constitutional: NAD, well developed, well nourished  HENT: Normocephalic, atraumatic  CV: Intact distal pulses, regular rate  Resp: No respiratory distress or labored breathing  GI: Soft and non-tender   Lymphatic: No lymphadenopathy palpated  Neuro: Alert and Oriented x 3, no focal deficits  Psych: Normal mood, normal affect, normal judgement, normal behavior  Skin: Warm, dry, no rashes, no erythema      Knee Exam (focused):                RIGHT LEFT   ROM:   0-130 0-130   Palpation: " Effusion negative negative     MJL tenderness Negative Negative     LJL tenderness Negative Negative   Meniscus: Isha Negative Negative    Apley's Compression Negative Negative   Instability: Varus stable stable     Valgus stable stable   Special Tests: Lachman Negative Negative     Posterior drawer Negative Negative     Anterior drawer Negative Negative     Pivot shift not tested not tested     Dial not tested not tested   Patella: Palpation Ttp medial facet  no tenderness     Mobility 1/4 1/4     Apprehension Negative Negative   Other: Single leg 1/4 squat not tested not tested      LE NV Exam: +2 DP/PT pulses bilaterally  Sensation intact to light touch L2-S1 bilaterally     Bilateral hip ROM demonstrates no pain actively or passively    No calf tenderness to palpation bilaterally    Knee Imaging    X-rays of the right knee were reviewed, which demonstrate no acute fracture or osseous abnormality.  I have reviewed the radiology report and agree with their impression.    MRI of the right knee were reviewed, which demonstrate with focal cartilage thinning of the medial facet of the patella.  I have reviewed the radiology report and agree with their impression.

## 2025-04-21 NOTE — LETTER
April 21, 2025     Patient: Rivas Gtz   YOB: 2005   Date of Visit: 4/21/2025       To Whom It May Concern:    Rivas Gtz was seen in my clinic on 4/21/2025 at 5:45 pm. Please excuse Rivas for his absence from work on this day to make the appointment.  He is able to return to all acitivities    If you have any questions or concerns, please don't hesitate to call.         Sincerely,         Olivier Lozoya,         CC: No Recipients

## 2025-05-16 ENCOUNTER — OFFICE VISIT (OUTPATIENT)
Age: 20
End: 2025-05-16
Payer: COMMERCIAL

## 2025-05-16 VITALS — HEIGHT: 73 IN | BODY MASS INDEX: 25.45 KG/M2 | WEIGHT: 192 LBS

## 2025-05-16 DIAGNOSIS — B07.0 PLANTAR WART, LEFT FOOT: Primary | ICD-10-CM

## 2025-05-16 PROCEDURE — 99212 OFFICE O/P EST SF 10 MIN: CPT | Performed by: STUDENT IN AN ORGANIZED HEALTH CARE EDUCATION/TRAINING PROGRAM

## 2025-05-16 NOTE — PROGRESS NOTES
"[facility] Podiatric Medicine and Surgery Office Visit    ASSESSMENT     Diagnoses and all orders for this visit:    Plantar wart, left foot                 Problem List Items Addressed This Visit    None  Visit Diagnoses         Plantar wart, left foot    -  Primary                  PLAN  Rivas and I discussed his left foot, all previous lesions of verruca are fully healed at this time.  He may return to my office for any new or worsening podiatric concerns.    SUBJECTIVE    Chief Complaint:  Bilateral big toe pain     Patient ID: Rivas [id number]     5/16/2025: Rivas presents today for follow-up regarding his left foot plantar wart.  He states that they are doing well.        The following portions of the patient's history were reviewed and updated as appropriate: allergies, current medications, past family history, past medical history, past social history, past surgical history and problem list.    Review of Systems   Constitutional: Negative.    Respiratory: Negative.     Cardiovascular: Negative.    Gastrointestinal: Negative.    Genitourinary: Negative.    Musculoskeletal: Negative.    Skin: Negative.          OBJECTIVE      Ht 6' 1\" (1.854 m)   Wt 87.1 kg (192 lb)   BMI 25.33 kg/m²        Physical Exam  Constitutional:       Appearance: Normal appearance.   HENT:      Head: Normocephalic and atraumatic.     Eyes:      General:         Right eye: No discharge.         Left eye: No discharge.       Cardiovascular:      Rate and Rhythm: Normal rate and regular rhythm.      Pulses:           Dorsalis pedis pulses are 2+ on the right side and 2+ on the left side.        Posterior tibial pulses are 2+ on the right side and 2+ on the left side.   Pulmonary:      Effort: Pulmonary effort is normal.      Breath sounds: Normal breath sounds.   Feet:      Left foot:      Toenail Condition: Left toenails are ingrown.      Comments: All previous areas of verruca are resolved today with intact skin lines, no " raised skin, no hyperpigmented lesions.    Skin:     General: Skin is warm.      Capillary Refill: Capillary refill takes less than 2 seconds.     Neurological:      Mental Status: He is alert and oriented to person, place, and time.      Sensory: Sensation is intact. No sensory deficit.     Psychiatric:         Mood and Affect: Mood normal.

## 2025-07-24 VITALS — WEIGHT: 194 LBS | HEIGHT: 73 IN | BODY MASS INDEX: 25.71 KG/M2

## 2025-07-24 DIAGNOSIS — M22.41 CHONDROMALACIA OF RIGHT PATELLOFEMORAL JOINT: Primary | ICD-10-CM

## 2025-07-24 PROCEDURE — 99213 OFFICE O/P EST LOW 20 MIN: CPT | Performed by: ORTHOPAEDIC SURGERY
